# Patient Record
Sex: MALE | Race: WHITE | NOT HISPANIC OR LATINO | URBAN - METROPOLITAN AREA
[De-identification: names, ages, dates, MRNs, and addresses within clinical notes are randomized per-mention and may not be internally consistent; named-entity substitution may affect disease eponyms.]

---

## 2017-01-01 ENCOUNTER — OFFICE VISIT - HEALTHEAST (OUTPATIENT)
Dept: PHYSICAL THERAPY | Facility: REHABILITATION | Age: 82
End: 2017-01-01

## 2017-01-01 ENCOUNTER — COMMUNICATION - HEALTHEAST (OUTPATIENT)
Dept: INTERNAL MEDICINE | Facility: CLINIC | Age: 82
End: 2017-01-01

## 2017-01-01 ENCOUNTER — HOSPITAL ENCOUNTER (OUTPATIENT)
Dept: MRI IMAGING | Facility: CLINIC | Age: 82
Discharge: HOME | End: 2017-08-29
Attending: INTERNAL MEDICINE

## 2017-01-01 ENCOUNTER — OFFICE VISIT - HEALTHEAST (OUTPATIENT)
Dept: CARDIOLOGY | Facility: CLINIC | Age: 82
End: 2017-01-01

## 2017-01-01 ENCOUNTER — OFFICE VISIT - HEALTHEAST (OUTPATIENT)
Dept: INTERNAL MEDICINE | Facility: CLINIC | Age: 82
End: 2017-01-01

## 2017-01-01 ENCOUNTER — RECORDS - HEALTHEAST (OUTPATIENT)
Dept: ADMINISTRATIVE | Facility: OTHER | Age: 82
End: 2017-01-01

## 2017-01-01 ENCOUNTER — AMBULATORY - HEALTHEAST (OUTPATIENT)
Dept: NURSING | Facility: CLINIC | Age: 82
End: 2017-01-01

## 2017-01-01 ENCOUNTER — AMBULATORY - HEALTHEAST (OUTPATIENT)
Dept: INTERNAL MEDICINE | Facility: CLINIC | Age: 82
End: 2017-01-01

## 2017-01-01 DIAGNOSIS — I48.0 PAROXYSMAL ATRIAL FIBRILLATION (H): ICD-10-CM

## 2017-01-01 DIAGNOSIS — R26.9 GAIT DISTURBANCE: ICD-10-CM

## 2017-01-01 DIAGNOSIS — I25.10 CORONARY ARTERY DISEASE INVOLVING NATIVE CORONARY ARTERY OF NATIVE HEART WITHOUT ANGINA PECTORIS: ICD-10-CM

## 2017-01-01 DIAGNOSIS — M48.061 SPINAL STENOSIS, LUMBAR: ICD-10-CM

## 2017-01-01 DIAGNOSIS — I10 ESSENTIAL HYPERTENSION WITH GOAL BLOOD PRESSURE LESS THAN 130/85: ICD-10-CM

## 2017-01-01 DIAGNOSIS — M81.0 OSTEOPOROSIS: ICD-10-CM

## 2017-01-01 DIAGNOSIS — M48.00 SPINAL STENOSIS: ICD-10-CM

## 2017-01-01 DIAGNOSIS — R26.89 IMBALANCE: ICD-10-CM

## 2017-01-01 DIAGNOSIS — E78.00 PURE HYPERCHOLESTEROLEMIA: ICD-10-CM

## 2017-01-01 DIAGNOSIS — I10 ESSENTIAL HYPERTENSION: ICD-10-CM

## 2017-01-01 DIAGNOSIS — I48.20 CHRONIC ATRIAL FIBRILLATION (H): ICD-10-CM

## 2017-01-01 DIAGNOSIS — Z86.73 HISTORY OF STROKE: ICD-10-CM

## 2017-01-01 DIAGNOSIS — Z51.81 MEDICATION MONITORING ENCOUNTER: ICD-10-CM

## 2017-01-01 DIAGNOSIS — M25.561 RIGHT KNEE PAIN, UNSPECIFIED CHRONICITY: ICD-10-CM

## 2017-01-01 DIAGNOSIS — R26.81 UNSTEADY GAIT: ICD-10-CM

## 2017-01-01 DIAGNOSIS — F34.1 DYSTHYMIA: ICD-10-CM

## 2017-01-01 DIAGNOSIS — M48.061 SPINAL STENOSIS OF LUMBAR REGION: ICD-10-CM

## 2017-01-01 DIAGNOSIS — I25.10 CORONARY ARTERY DISEASE INVOLVING NATIVE CORONARY ARTERY WITHOUT ANGINA PECTORIS: ICD-10-CM

## 2017-01-01 DIAGNOSIS — M48.061 SPINAL STENOSIS OF LUMBAR REGION, UNSPECIFIED WHETHER NEUROGENIC CLAUDICATION PRESENT: ICD-10-CM

## 2017-01-01 ASSESSMENT — MIFFLIN-ST. JEOR
SCORE: 1483.6
SCORE: 1497.21
SCORE: 1510.82

## 2017-01-25 ENCOUNTER — COMMUNICATION - HEALTHEAST (OUTPATIENT)
Dept: ADMINISTRATIVE | Facility: CLINIC | Age: 82
End: 2017-01-25

## 2017-02-12 ENCOUNTER — COMMUNICATION - HEALTHEAST (OUTPATIENT)
Dept: INTERNAL MEDICINE | Facility: CLINIC | Age: 82
End: 2017-02-12

## 2017-02-12 DIAGNOSIS — E78.00 PURE HYPERCHOLESTEROLEMIA: ICD-10-CM

## 2017-03-01 ENCOUNTER — AMBULATORY - HEALTHEAST (OUTPATIENT)
Dept: CARE COORDINATION | Facility: CLINIC | Age: 82
End: 2017-03-01

## 2017-03-31 ENCOUNTER — OFFICE VISIT - HEALTHEAST (OUTPATIENT)
Dept: CARDIOLOGY | Facility: CLINIC | Age: 82
End: 2017-03-31

## 2017-03-31 DIAGNOSIS — R00.2 PALPITATIONS: ICD-10-CM

## 2017-03-31 DIAGNOSIS — I25.10 CORONARY ARTERY DISEASE INVOLVING NATIVE CORONARY ARTERY OF NATIVE HEART WITHOUT ANGINA PECTORIS: ICD-10-CM

## 2017-03-31 DIAGNOSIS — E78.00 PURE HYPERCHOLESTEROLEMIA: ICD-10-CM

## 2017-03-31 DIAGNOSIS — I48.0 PAROXYSMAL ATRIAL FIBRILLATION (H): ICD-10-CM

## 2017-03-31 ASSESSMENT — MIFFLIN-ST. JEOR: SCORE: 1533.5

## 2017-04-03 ENCOUNTER — HOSPITAL ENCOUNTER (OUTPATIENT)
Dept: CARDIOLOGY | Facility: CLINIC | Age: 82
Discharge: HOME | End: 2017-04-03
Attending: INTERNAL MEDICINE

## 2017-04-03 DIAGNOSIS — R00.2 PALPITATIONS: ICD-10-CM

## 2017-04-05 ENCOUNTER — COMMUNICATION - HEALTHEAST (OUTPATIENT)
Dept: CARDIOLOGY | Facility: CLINIC | Age: 82
End: 2017-04-05

## 2017-04-05 DIAGNOSIS — I48.20 CHRONIC ATRIAL FIBRILLATION (H): ICD-10-CM

## 2017-04-10 ENCOUNTER — COMMUNICATION - HEALTHEAST (OUTPATIENT)
Dept: INTERNAL MEDICINE | Facility: CLINIC | Age: 82
End: 2017-04-10

## 2017-04-10 DIAGNOSIS — N40.0 BPH (BENIGN PROSTATIC HYPERPLASIA): ICD-10-CM

## 2017-04-11 ENCOUNTER — OFFICE VISIT - HEALTHEAST (OUTPATIENT)
Dept: AUDIOLOGY | Facility: CLINIC | Age: 82
End: 2017-04-11

## 2017-04-11 DIAGNOSIS — H90.3 SENSORINEURAL HEARING LOSS, BILATERAL: ICD-10-CM

## 2017-04-11 DIAGNOSIS — H61.23 EXCESSIVE CERUMEN IN BOTH EAR CANALS: ICD-10-CM

## 2017-04-17 ENCOUNTER — OFFICE VISIT - HEALTHEAST (OUTPATIENT)
Dept: INTERNAL MEDICINE | Facility: CLINIC | Age: 82
End: 2017-04-17

## 2017-04-17 ENCOUNTER — COMMUNICATION - HEALTHEAST (OUTPATIENT)
Dept: INTERNAL MEDICINE | Facility: CLINIC | Age: 82
End: 2017-04-17

## 2017-04-17 DIAGNOSIS — I10 ESSENTIAL HYPERTENSION WITH GOAL BLOOD PRESSURE LESS THAN 130/85: ICD-10-CM

## 2017-04-17 DIAGNOSIS — Z51.81 MEDICATION MONITORING ENCOUNTER: ICD-10-CM

## 2017-04-17 DIAGNOSIS — M48.061 SPINAL STENOSIS, LUMBAR: ICD-10-CM

## 2017-04-17 DIAGNOSIS — E78.00 PURE HYPERCHOLESTEROLEMIA: ICD-10-CM

## 2017-04-17 DIAGNOSIS — I25.10 CORONARY ARTERY DISEASE INVOLVING NATIVE CORONARY ARTERY OF NATIVE HEART WITHOUT ANGINA PECTORIS: ICD-10-CM

## 2017-04-17 DIAGNOSIS — I48.0 PAROXYSMAL ATRIAL FIBRILLATION (H): ICD-10-CM

## 2017-04-17 ASSESSMENT — MIFFLIN-ST. JEOR: SCORE: 1547.11

## 2018-01-01 ENCOUNTER — RECORDS - HEALTHEAST (OUTPATIENT)
Dept: ADMINISTRATIVE | Facility: OTHER | Age: 83
End: 2018-01-01

## 2018-01-01 ENCOUNTER — HOME CARE/HOSPICE - HEALTHEAST (OUTPATIENT)
Dept: HOSPICE | Facility: HOSPICE | Age: 83
End: 2018-01-01

## 2018-01-01 ENCOUNTER — COMMUNICATION - HEALTHEAST (OUTPATIENT)
Dept: INTERNAL MEDICINE | Facility: CLINIC | Age: 83
End: 2018-01-01

## 2018-01-01 ENCOUNTER — AMBULATORY - HEALTHEAST (OUTPATIENT)
Dept: HOSPICE | Facility: HOSPICE | Age: 83
End: 2018-01-01

## 2018-01-01 ENCOUNTER — OFFICE VISIT - HEALTHEAST (OUTPATIENT)
Dept: INTERNAL MEDICINE | Facility: CLINIC | Age: 83
End: 2018-01-01

## 2018-01-01 ENCOUNTER — COMMUNICATION - HEALTHEAST (OUTPATIENT)
Dept: ADMINISTRATIVE | Facility: CLINIC | Age: 83
End: 2018-01-01

## 2018-01-01 DIAGNOSIS — Z51.81 MEDICATION MONITORING ENCOUNTER: ICD-10-CM

## 2018-01-01 DIAGNOSIS — I25.10 CORONARY ARTERY DISEASE INVOLVING NATIVE CORONARY ARTERY OF NATIVE HEART WITHOUT ANGINA PECTORIS: ICD-10-CM

## 2018-01-01 DIAGNOSIS — M48.061 SPINAL STENOSIS OF LUMBAR REGION, UNSPECIFIED WHETHER NEUROGENIC CLAUDICATION PRESENT: ICD-10-CM

## 2018-01-01 DIAGNOSIS — I48.0 PAROXYSMAL ATRIAL FIBRILLATION (H): ICD-10-CM

## 2018-01-01 DIAGNOSIS — E78.00 PURE HYPERCHOLESTEROLEMIA: ICD-10-CM

## 2018-01-01 DIAGNOSIS — M48.00 SPINAL STENOSIS, UNSPECIFIED SPINAL REGION: ICD-10-CM

## 2018-01-01 DIAGNOSIS — F34.1 DYSTHYMIA: ICD-10-CM

## 2018-01-01 DIAGNOSIS — I10 ESSENTIAL HYPERTENSION: ICD-10-CM

## 2018-01-01 LAB
ALBUMIN SERPL-MCNC: 3.3 G/DL (ref 3.5–5)
ALP SERPL-CCNC: 65 U/L (ref 45–120)
ALT SERPL W P-5'-P-CCNC: 11 U/L (ref 0–45)
ANION GAP SERPL CALCULATED.3IONS-SCNC: 7 MMOL/L (ref 5–18)
AST SERPL W P-5'-P-CCNC: 15 U/L (ref 0–40)
BILIRUB SERPL-MCNC: 0.7 MG/DL (ref 0–1)
BUN SERPL-MCNC: 19 MG/DL (ref 8–28)
CALCIUM SERPL-MCNC: 8.8 MG/DL (ref 8.5–10.5)
CHLORIDE BLD-SCNC: 106 MMOL/L (ref 98–107)
CHOLEST SERPL-MCNC: 128 MG/DL
CO2 SERPL-SCNC: 26 MMOL/L (ref 22–31)
CREAT SERPL-MCNC: 0.89 MG/DL (ref 0.7–1.3)
ERYTHROCYTE [DISTWIDTH] IN BLOOD BY AUTOMATED COUNT: 11.3 % (ref 11–14.5)
FASTING STATUS PATIENT QL REPORTED: YES
GFR SERPL CREATININE-BSD FRML MDRD: >60 ML/MIN/1.73M2
GLUCOSE BLD-MCNC: 96 MG/DL (ref 70–125)
HCT VFR BLD AUTO: 40.7 % (ref 40–54)
HDLC SERPL-MCNC: 63 MG/DL
HGB BLD-MCNC: 13.9 G/DL (ref 14–18)
LDLC SERPL CALC-MCNC: 54 MG/DL
MCH RBC QN AUTO: 32.4 PG (ref 27–34)
MCHC RBC AUTO-ENTMCNC: 34.2 G/DL (ref 32–36)
MCV RBC AUTO: 95 FL (ref 80–100)
PLATELET # BLD AUTO: 166 THOU/UL (ref 140–440)
PMV BLD AUTO: 6.9 FL (ref 7–10)
POTASSIUM BLD-SCNC: 3.9 MMOL/L (ref 3.5–5)
PROT SERPL-MCNC: 6 G/DL (ref 6–8)
RBC # BLD AUTO: 4.3 MILL/UL (ref 4.4–6.2)
SODIUM SERPL-SCNC: 139 MMOL/L (ref 136–145)
TRIGL SERPL-MCNC: 53 MG/DL
WBC: 3.9 THOU/UL (ref 4–11)

## 2018-01-01 RX ORDER — BISACODYL 10 MG
10 SUPPOSITORY, RECTAL RECTAL DAILY PRN
Status: SHIPPED | COMMUNITY
Start: 2018-01-01

## 2018-01-01 RX ORDER — ATROPINE SULFATE 10 MG/ML
2 SOLUTION/ DROPS OPHTHALMIC EVERY 4 HOURS PRN
Status: SHIPPED | COMMUNITY
Start: 2018-01-01

## 2018-01-01 RX ORDER — LORAZEPAM 2 MG/ML
2 CONCENTRATE ORAL
Status: SHIPPED | COMMUNITY
Start: 2018-01-01

## 2018-01-01 RX ORDER — LORAZEPAM 2 MG/ML
0.5 CONCENTRATE ORAL EVERY 4 HOURS PRN
Status: SHIPPED | COMMUNITY
Start: 2018-01-01

## 2018-01-01 ASSESSMENT — MIFFLIN-ST. JEOR: SCORE: 1540.3

## 2018-07-06 ENCOUNTER — RECORDS - HEALTHEAST (OUTPATIENT)
Dept: ADMINISTRATIVE | Facility: OTHER | Age: 83
End: 2018-07-06

## 2021-05-29 ENCOUNTER — RECORDS - HEALTHEAST (OUTPATIENT)
Dept: ADMINISTRATIVE | Facility: CLINIC | Age: 86
End: 2021-05-29

## 2021-05-30 VITALS — WEIGHT: 194 LBS | BODY MASS INDEX: 27.16 KG/M2 | HEIGHT: 71 IN

## 2021-05-30 VITALS — WEIGHT: 191 LBS | HEIGHT: 71 IN | BODY MASS INDEX: 26.74 KG/M2

## 2021-05-31 VITALS — WEIGHT: 180 LBS | BODY MASS INDEX: 25.2 KG/M2 | HEIGHT: 71 IN

## 2021-05-31 VITALS — BODY MASS INDEX: 25.62 KG/M2 | HEIGHT: 71 IN | WEIGHT: 183 LBS

## 2021-05-31 VITALS — HEIGHT: 71 IN | BODY MASS INDEX: 26.04 KG/M2 | WEIGHT: 186 LBS

## 2021-05-31 VITALS — HEIGHT: 71 IN | BODY MASS INDEX: 27.06 KG/M2

## 2021-05-31 VITALS — WEIGHT: 185 LBS | BODY MASS INDEX: 25.8 KG/M2

## 2021-06-01 VITALS — HEIGHT: 71 IN | BODY MASS INDEX: 25.1 KG/M2

## 2021-06-01 VITALS — WEIGHT: 189 LBS | HEIGHT: 72 IN | BODY MASS INDEX: 25.6 KG/M2

## 2021-06-02 ENCOUNTER — RECORDS - HEALTHEAST (OUTPATIENT)
Dept: ADMINISTRATIVE | Facility: CLINIC | Age: 86
End: 2021-06-02

## 2021-06-09 ENCOUNTER — RECORDS - HEALTHEAST (OUTPATIENT)
Dept: ADMINISTRATIVE | Facility: CLINIC | Age: 86
End: 2021-06-09

## 2021-06-09 NOTE — PROGRESS NOTES
[facility] Progress Note    Assessment:  1.  Paroxysmal atrial fibrillation with prior ablation procedure.  He had early recurrence during the first year but no documented recurrence of events.  He does have a significantly elevated chads 2 score.  He has had some occasional falling events these are typically recognized and he is able to get him self seated without a hard fall.  He was not able to have a successful placement of watchman device.  At this time his preference is to remain on Eliquis which we reviewed in detail today.  He is going to continue off aspirin.  I am suggesting an event monitor for 2 weeks to exclude occult dysrhythmia although the events are relatively infrequent and will ask that he return to visit with Dr. Odonnell.    2.  Coronary artery disease.  History of coronary intervention in 2008 without complaints of ongoing angina.  He called with some vague anginal symptoms in August 2016 and subsequently had a negative stress test.  He is on statins with the most recent LDL 45 November 2015.    3.  Hypertension.  He is off hydrochlorothiazide and blood pressures have been under good control.  He is asked to continue to monitor his blood pressure and I suggested considering changing the lisinopril to nighttime dosing.        Plan: As outlined with follow-up in 6 months    1. Palpitations  SHAILA Hook-Up   2. Paroxysmal atrial fibrillation     3. Coronary artery disease involving native coronary artery of native heart without angina pectoris     4. Hypercholesterolemia           An After Visit Summary was printed and given to the patient.    Subjective:    Junior Hudson is a 88 y.o. male who returned for a planned  follow up visit.  He reports today that he has had no complaints of chest discomfort or significant shortness of breath.  He has had infrequent falling events and now is utilizing a walker more regularly.  He is accompanied by his daughter today who indicates that  the last fall was in February 2017.  He states that during these events he does feel lightheaded or a warning symptom and then has to sit down as he feels weak all over but has not had syncope or near syncope.  This only happens in the standing position but can happen after he has been standing for some time.  He has had no additional anginal symptoms he has not utilized nitroglycerin.  He is off aspirin since he is on Eliquis.  He had an attempted placement of watchman device but this was not successful.  We reviewed the risks of bleeding versus the risks of stroke he has had prior atrial fibrillation ablation.    Review of Systems:   General: WNL  Eyes: WNL  Ears/Nose/Throat: WNL  Lungs: WNL  Heart: WNL  Stomach: WNL  Bladder: WNL  Muscle/Joints: WNL  Skin: WNL  Nervous System: WNL  Mental Health: WNL     Blood: WNL      Problem List:    Patient Active Problem List   Diagnosis     Hypercholesterolemia     Essential hypertension     Insomnia     Glaucoma     Cataract     Coronary artery disease involving native coronary artery without angina pectoris     Paroxysmal Atrial Fibrillation     Benign Prostatic Hypertrophy     Osteoporosis     Cerumen Impaction     Presbycusis     Cellulitis     Dysphagia     Hx-TIA (transient ischemic attack)     History of skin cancer     Medication monitoring encounter     Spinal stenosis, lumbar     Paroxysmal atrial fibrillation       Social History     Social History     Marital status:      Spouse name: N/A     Number of children: N/A     Years of education: N/A     Occupational History     Not on file.     Social History Main Topics     Smoking status: Never Smoker     Smokeless tobacco: Not on file     Alcohol use Not on file     Drug use: Not on file     Sexual activity: Not on file     Other Topics Concern     Not on file     Social History Narrative       No family history on file.    Current Outpatient Prescriptions   Medication Sig Dispense Refill     calcium  carbonate (OS-LEWIS) 600 mg (1,500 mg) tablet Take 600 mg by mouth 2 (two) times a day with meals.       cholecalciferol, vitamin D3, (VITAMIN D3) 1,000 unit capsule Take 1,000 Units by mouth daily.        citalopram (CELEXA) 20 MG tablet Take one (1) tablet by mouth daily 90 tablet 3     ELIQUIS 5 mg Tab tablet Take one (1) tablet every 12 hours 180 tablet 1     finasteride (PROSCAR) 5 mg tablet Take one (1) tablet daily 90 tablet 3     FOLIC ACID/MULTIVITS-MIN/LUT (CENTRUM SILVER ORAL) Take 1 tablet by mouth daily.       glucosamine-chondroitin 500-400 mg cap Take 3 capsules by mouth 2 (two) times a day.       latanoprost (XALATAN) 0.005 % ophthalmic solution Administer 1 drop to both eyes bedtime.        lisinopril (PRINIVIL,ZESTRIL) 2.5 MG tablet Take 1 tablet (2.5 mg total) by mouth daily. 30 tablet 4     nitroglycerin (NITROSTAT) 0.4 MG SL tablet Place 1 tablet under the tongue as needed for chest pain, if no relief, may repeat in 5 minutes up to a max of 3 doses, if still no relief c 25 tablet 4     omega-3 fatty acids-vitamin E 1,000 mg cap Take 2 capsules by mouth daily.       pravastatin (PRAVACHOL) 20 MG tablet Take one (1) tablet each night at bedtime 90 tablet 1     timolol maleate (TIMOPTIC) 0.5 % ophthalmic solution Administer 1 drop to both eyes 2 (two) times a day. daily       hydrochlorothiazide (MICROZIDE) 12.5 mg capsule Take 1 capsule (12.5 mg total) by mouth daily. In the morning if BP is over 140 90 capsule 1     lisinopril (PRINIVIL,ZESTRIL) 2.5 MG tablet Take 1 tablet (2.5 mg total) by mouth daily. 90 tablet 1     zolpidem (AMBIEN) 10 mg tablet Take 10 mg by mouth bedtime as needed for sleep.       zolpidem (AMBIEN) 10 mg tablet Take 1/2-1 pill at night if needed for insomnia. This medicine has risk of confusion, memory effects and fall risk. 30 tablet 0     No current facility-administered medications for this visit.        Objective:     /68 (Patient Site: Right Arm, Patient Position:  "Sitting, Cuff Size: Adult Regular)  Pulse 68  Resp 18  Ht 5' 11\" (1.803 m)  Wt 191 lb (86.6 kg)  BMI 26.64 kg/m2  191 lb (86.6 kg)     Blood pressure standing 118/70  Physical Exam:    GENERAL APPEARANCE: alert, no apparent distress  HEENT: no scleral icterus or xanthelasma  NECK: jugular venous pressure within normal limits  CHEST: symmetric, the lungs are clear to auscultation  CARDIOVASCULAR: regular rhythm with soft systolic murmur; no carotid bruits  Abdomen: No Organomegaly, masses, bruits, or tenderness. Bowels sounds are present      EXTREMITIES: no cyanosis, clubbing or edema    Cardiac Testing:  Atrial fibrillation.     Conclusions      Summary  1. Normal left ventricular size and systolic performance. The ejection  fraction is estimated to be 60%.  2. There is trace aortic insufficiency.  3. The left atrium is mildly enlarged.  4. There is no thrombus detected within the left atrium/left atrial  appendage (AUSTIN).  5. The left atrial appendage is relatively small and shallow. See attached  images (Note: to view in EPIC, one must open the attached PDF report by  clicking on \"Results\" > \"Scan on ....\" under the signature line of the  Epic generated report text below).  6. Echo contrast examination was performed using agitated NS as contrast  agent. The right heart opacity was adequate and the left heart was  adequately visualized. There was no evidence of right to left shunt during  spontaneous respiration or following release of Valsalva    CONCLUSION:   1. Lexiscan stress nuclear study is negative for inducible myocardial   ischemia or infarction.      COMMENTS:   Comparison with the images of the exam of April 7, 2011 demonstrates resolution of the minimal inferior ischemia seen on prior study.        Francine Nunez MD  Lab Results:    Lab Results   Component Value Date     11/15/2016    K 4.2 11/15/2016     11/15/2016    CO2 23 11/15/2016    BUN 15 11/15/2016    CREATININE 0.97 " 11/15/2016    CALCIUM 8.8 11/15/2016     Lab Results   Component Value Date    CHOL 122 11/04/2015    TRIG 47 11/04/2015    HDL 68 11/04/2015     BNP (pg/mL)   Date Value   02/10/2012 45     Creatinine (mg/dL)   Date Value   11/15/2016 0.97   10/24/2016 1.02   09/06/2016 1.11   08/22/2016 1.17     LDL Calculated (mg/dL)   Date Value   11/04/2015 45   11/12/2014 50   11/13/2013 57.2     Lab Results   Component Value Date    WBC 3.7 (L) 11/17/2016    WBC 3.9 (L) 11/12/2014    HGB 13.4 (L) 11/17/2016    HCT 38.3 (L) 11/17/2016    MCV 94 11/17/2016     11/17/2016               This note has been dictated using voice recognition software. Any grammatical or context distortions are unintentional and inherent to the software.      Rodney Spear M.D., F.A.C.C.  [facility]  [phone]

## 2021-06-10 NOTE — PROGRESS NOTES
[facility] Follow Up Note    Junior Hudson   88 y.o. male    Date of Visit: 4/17/2017    Chief Complaint   Patient presents with     Follow-up     4 mo follow up, tremors in hands getting worse, couple of more falls since last visit.     Subjective  Junior is here with his wife, lives with him at independent living.  Also his daughter.    Patient has had some progressive decline with unsteady gait and global weakness.  He has had moderate to severe lumbar spinal stenosis, last imaged October 2016.    He's also had some small strokes with 3 areas of subacute infarct last October and an old left basal ganglia lacunae.  There was some amyloid angiopathy noted on October 2016 MRI.  Ultrasound of the carotids was negative for stenosis.    He had fallen in February with some leg weakness.  Uses a walker at all times now.  He still walking the halls and going to the workout room and uses an elliptical-type , and feels good with that.  He is able to walk stronger when he is walking regularly.  He's had 2 small falls where he slumped to the floor, no head trauma or syncope, his legs were weak at the time.    No significant radicular pain.  No foot drop.  He does take an Aleve most mornings because of the pain in his right lower back, but does not describe it as severe.    He does have paroxysmal atrial fibrillation but no documented atrial fibrillation since ablation in 2011.  He has had small strokes as above.  Had a questionable dysarthria spell in 2014.  He is therefore still on the Eliquis for anticoagulation.  He was evaluated for the watchman device was left atrial appendage did not fit it in a BRIAN was done.  He just finished a 2 week Holter monitor after seeing his cardiologist last month.    I discussed bleeding risk with his current anticoagulant, he accepts the risk and wishes to continue at this time, given his chads score and stroke risk.    No chest pain or chest pressure.   August 2016 cardiac Lexiscan stress test was negative for ischemia and normal ejection fraction of 60%.  Past history of coronary artery disease with drug-eluting stent in 2008.  No longer on aspirin.    Still on low-dose pravastatin.    Hypertension and labile in the past.  Avoiding low blood pressures with his falls.  In December of last year's blood pressure was 112/74.  Last month with cardiology was 122/68.  Home checks it's usually in the 130s over 70s.  He is no longer using HCTZ, and he has not taken any extra lisinopril.  He takes 2.5 mg of lisinopril in the evening.    No longer taking Ambien, sleeping adequately with melatonin 10 mg in the evening.  I discussed fall risk with melatonin, but he does not believe it makes him on more unsteady or too sleepy.  Still on citalopram.    He does have frequent urination at night with his BPH.  Still on Proscar.  No dysuria.    Also regular no new abdominal pain.    Normal TSH last October.  Hemoglobin 13.4 last November.    Dizzy spells are not associated with his glaucoma drops.    Mild essential tremor, no rest tremor or parkinsonian signs.    PMHx:    Past Medical History:   Diagnosis Date     BPH (benign prostatic hyperplasia)     proscar     CAD (coronary artery disease)     CARMELLA mid PDA '08.  Neg stress echo/nl EF and wall motion.  mild MR '12     Coronary artery disease involving native coronary artery without angina pectoris     Cath 08 with severe stenosis of D2 off LAD and PDA off RCA.   Taxus stent of PDA and PTCA of RCA branch.  No intervention at D2.        Dysthymia     citalopram     Glaucoma     on drops     HTN (hypertension)     labile     Paroxysmal atrial fibrillation     none since ablation with pulm. v. isolation '11     TIA (transient ischemic attack)     Possible:  dysarthria 2/14 (carotid u/s neg for stenosis).  6/15 confusion/ left hand numb     PSHx:    Past Surgical History:   Procedure Laterality Date     CORONARY STENT PLACEMENT      9  "years ago     failed watchman  11/17/2016     SD ABLATE HEART DYSRHYTHM FOCUS      Description: Catheter Ablation Atrial Fibrillation;  Recorded: 07/15/2014;  Comments: PVI Apr 2011 (Cryo-PVI + Roof line + CTI line)     SD REMOVAL GALLBLADDER      Description: Cholecystectomy;  Recorded: 09/17/2008;  Comments: lap in '90     Immunizations:   Immunization History   Administered Date(s) Administered     Influenza high dose, seasonal 09/06/2016     Influenza, Seasonal, Inj PF 10/26/2010     Influenza, inj, historic 10/20/2008, 09/29/2009, 09/15/2011, 09/29/2012     Pneumo Conj 13-V (2010&after) 05/04/2015     Pneumo Polysac 23-V 05/04/1993, 08/15/2002     Td, historic 04/06/2000       ROS A comprehensive review of systems was performed and was otherwise negative    Medications, allergies, and problem list were reviewed and updated    Exam  /78  Pulse 65  Ht 5' 11\" (1.803 m)  Wt 194 lb (88 kg)  SpO2 96%  BMI 27.06 kg/m2  Appears well alert and oriented ×3.  Mild essential type tremor in the hands.  Not appearing Parkinsonian.  It was stand up from a sitting position and ambulate to exam table.  Able to climb up on the exam table with standby assistance and his walker.  No JVD.  Lungs are clear to auscultation.  Face is symmetric and extract her muscles intact.  Speech within normal limits.  Moving all 4 extremities.  No foot drop.  Heart is regular without murmur.  Abdomen soft nontender.  Trace- +1 lower extremity edema, slightly increased, he did have Easter ham yesterday.    Assessment/Plan  1. Essential hypertension with goal blood pressure less than 130/85  Runs on the low side, but controlled.  Avoiding low blood pressures with his falls.  Continue lisinopril 2.5 mg a day at this time at night.  If lower blood pressures, may need to discontinue.  He will not take an extra lisinopril unless blood pressure spike above 160/90.    2. Hypercholesterolemia  Pravastatin.    3. Coronary artery disease involving " native coronary artery of native heart without angina pectoris  Asymptomatic, no aspirin as he is on the Eliquis.    4. Paroxysmal atrial fibrillation  No evidence of recurrence, with history of strokes and will stay on anticoagulation.  2 week Holter monitor result pending, to evaluate for occult paroxysmal atrial fibrillation.  He'll follow-up with the cardiologist as needed    5. Spinal stenosis, lumbar  Moderate to severe, he does not wish to consider surgery.  Maintain regular ambulation.  Continue to use four-wheel walker at all times.    He does wish to continue on the Aleve.  I did warn him on bleeding risk, as well as effect on blood pressure and risk for renal injury.  Patient feels the benefit outweighs the risk for him and he wishes to continue.    6. Medication monitoring encounter    - Comprehensive Metabolic Panel    Insomnia and mild dysthymia.  Continue citalopram and melatonin daily at bedtime.    BPH, stable on Proscar.    Continue glaucoma drops.    Heartburn controlled on omeprazole.    Return in about 6 months (around 10/17/2017) for Recheck.   There are no Patient Instructions on file for this visit.  Ras Murguia MD  Total time with patient over 25 minutes and over 50% coord care.  Time all face to face.  The following high BMI interventions were performed this visit: encouragement to exercise    Current Outpatient Prescriptions   Medication Sig Dispense Refill     calcium carbonate (OS-LEWIS) 600 mg (1,500 mg) tablet Take 600 mg by mouth 2 (two) times a day with meals.       cholecalciferol, vitamin D3, (VITAMIN D3) 1,000 unit capsule Take 1,000 Units by mouth daily.        citalopram (CELEXA) 20 MG tablet Take one (1) tablet by mouth daily 90 tablet 3     ELIQUIS 5 mg Tab tablet Take one (1) tablet every 12 hours 180 tablet 1     finasteride (PROSCAR) 5 mg tablet Take one (1) tablet daily 90 tablet 3     FOLIC ACID/MULTIVITS-MIN/LUT (CENTRUM SILVER ORAL) Take 1 tablet by mouth daily.        glucosamine-chondroitin 500-400 mg cap Take 3 capsules by mouth 2 (two) times a day.       latanoprost (XALATAN) 0.005 % ophthalmic solution Administer 1 drop to both eyes bedtime.        lisinopril (PRINIVIL,ZESTRIL) 2.5 MG tablet Take 1 tablet (2.5 mg total) by mouth daily. 30 tablet 4     melatonin 10 mg cap Take by mouth.       omega-3 fatty acids-vitamin E 1,000 mg cap Take 2 capsules by mouth daily.       pravastatin (PRAVACHOL) 20 MG tablet Take one (1) tablet each night at bedtime 90 tablet 1     timolol maleate (TIMOPTIC) 0.5 % ophthalmic solution Administer 1 drop to both eyes 2 (two) times a day. daily       nitroglycerin (NITROSTAT) 0.4 MG SL tablet Place 1 tablet under the tongue as needed for chest pain, if no relief, may repeat in 5 minutes up to a max of 3 doses, if still no relief c 25 tablet 4     No current facility-administered medications for this visit.      Allergies   Allergen Reactions     Sulfa (Sulfonamide Antibiotics) Rash     Social History   Substance Use Topics     Smoking status: Never Smoker     Smokeless tobacco: None     Alcohol use None

## 2021-06-10 NOTE — PROGRESS NOTES
Audiology only; hearing aid check     Patient reported intermittent functionality of left device; right device is working well. Visual inspection indicated both devices to be in good repair; right device's wax guard was partially occluded and changed, as was the dome. Listening checks revealed strong signals, free from distortion in each device. Otoscopy yielded partial cerumen occlusions in both ears; it is possible that if cerumen shifts or dome is placed next to cerumen, sound quality of devices is affected or unable to be perceived by Mr. Hudson. Cerumen management by a physician was recommended. Further HAC appointments may be made on an as-needed basis. Mr. Hudson and his adult daughter both expressed verbal understanding of this information and plan.    Bryant Mosher, [facility]  [place] Licensed Audiologist [id number]

## 2021-06-12 NOTE — PROGRESS NOTES
[facility] Daily Progress     Patient Name: Junior Hudson  Date: 9/13/2017  Visit #: 4/12  Referral Diagnosis: Spinal Stenosis, unsteady gait  Referring provider: Ras Murguia MD  Visit Diagnosis:     ICD-10-CM    1. Spinal stenosis, lumbar M48.06    2. Imbalance R26.89    3. Gait disturbance R26.9    4. Osteoporosis M81.0        Assessment:     Patient presents with gait disturbance, lumbar stenosis and history of falls due to bilateral leg weakness and imbalance. He is a fall risk scoring 20 seconds on his TUG test with 14 seconds or less being a decreased falls risk. Patient able to tolerate 25 minutes of continuous activity with SBA. Patient demonstrates decreased confidence in higher level activity and needs encouragement to challenge self.        HEP/POC compliance is  good .  Patient demonstrates understanding/independence with home program.  Patient is benefitting from skilled physical therapy and is making steady progress toward functional goals.  Patient is appropriate to continue with skilled physical therapy intervention, as indicated by initial plan of care.  Patient is progressing appropriately regarding strength, mobility and symptom management.    Goal Status:  Pt. will be independent with home exercise program in : in other weeks;12 weeks  Pt. will be able to walk : 10 minutes;with less difficulty;for household mobility;for community mobility;for exercise/recreation;other minutes;in other weeks  Other Minutes:: 12-15 minutes  Other Weeks:: 8-10 weeks  Patient will ascend / descend: step;curb;with assistive device;with less difficulty;in 6 weeks  Patient will transfer: sit/stand;supine/sit;floor/stand;for in/out of bed;for in/out of chair;with less difficulty;in other weeks  other weeks: 8-10 weeks  No Data Recorded    Plan / Patient Education:     Continue with initial plan of care.  Progress with home program as tolerated:     Plan for next visit: functional mobility, curb and  "step training, sit to stands, yoga ball ex, - patient would like to be able to do the step at his daughters house, which he had a lot of difficulty with before.     Subjective:     Pain Ratin Only able to perform new exercises 1x a day, Going to reception this weekend and needs to negotiate stairs    Functional limitations are described as occurring with:   performing routine daily activities  transitional movements sit to stand and sit to supine  walking for extended periods of time      Objective:   Transfers sit to stand independent, arms on chair, safely   Patient ambulates with RW but was able to ambulate without, using CGA for safety  Nu Step x 7 min, level 4, seat 13 arms 11  Stairs retro and lateral one step with weight shifting, Up/down 4 steps, 2 rails SBA up left/down right  Treatment Today     Review of HEP  Exercises:  Exercise #1: sit/ 30\" daily  Comment #1: goal 5+  Exercise #2: seated marching  Comment #2: 20 times 2x daily  Exercise #3: hip extension/abduction standing at counter adding \"teetor totter\" for balance  Comment #3: 10 times 2x daily      TREATMENT MINUTES COMMENTS   Evaluation     Self-care/ Home management     Manual therapy     Neuromuscular Re-education     Therapeutic Activity     Therapeutic Exercises 15 See HEP   Gait training 12 See above stairs   Modality__________________                Total 27    Blank areas are intentional and mean the treatment did not include these items.       Lizzie Bell, PT  2017    "

## 2021-06-12 NOTE — PROGRESS NOTES
[facility] Daily Progress     Patient Name: Junior Hudson  Date: 9/8/2017  Visit #: 3/12  Referral Diagnosis: Spinal Stenosis, unsteady gait  Referring provider: Ras Murguia MD  Visit Diagnosis:     ICD-10-CM    1. Spinal stenosis, lumbar M48.06    2. Imbalance R26.89    3. Gait disturbance R26.9        Assessment:     Patient presents with gait disturbance, lumbar stenosis and history of falls due to bilateral leg weakness and imbalance. He is a fall risk scoring 20 seconds on his TUG test with 14 seconds or less being a decreased falls risk.    Today patient was able to perform 10 sit to stands using arm rests for support but did not reach for his rolling walker for balance once standing. Patient was educated to scoot himself forward in the chair and to use momentum of his body weight to help assist him for getting up from chair. Patient is very motivated in therapy, today was spent on stepping and gait training. Added hip abduction/extension to HEP.     HEP/POC compliance is  good .  Patient demonstrates understanding/independence with home program.  Patient is benefitting from skilled physical therapy and is making steady progress toward functional goals.  Patient is appropriate to continue with skilled physical therapy intervention, as indicated by initial plan of care.  Patient is progressing appropriately regarding strength, mobility and symptom management.    Goal Status:  Pt. will be independent with home exercise program in : in other weeks;12 weeks  Pt. will be able to walk : 10 minutes;with less difficulty;for household mobility;for community mobility;for exercise/recreation;other minutes;in other weeks  Other Minutes:: 12-15 minutes  Other Weeks:: 8-10 weeks  Patient will ascend / descend: step;curb;with assistive device;with less difficulty;in 6 weeks  Patient will transfer: sit/stand;supine/sit;floor/stand;for in/out of bed;for in/out of chair;with less difficulty;in other  "weeks  other weeks: 8-10 weeks  No Data Recorded    Plan / Patient Education:     Continue with initial plan of care.  Progress with home program as tolerated:     Plan for next visit: functional mobility, curb and step training, sit to stands, yoga ball ex, - patient would like to be able to do the step at his daughters house, which he had a lot of difficulty with before.     Subjective:     Pain Ratin  Patient reports he has been doing really well with the exercise Lizzie has given him, he has been working on it 30 seconds per day. He was able to perform a sit to  the lobby without using his walker for assistance and is proud of it. He is having some R knee pain where he is thinking about getting a shot for his pain. He has been doing the recumbent bike at his living facility.     Functional limitations are described as occurring with:   performing routine daily activities  transitional movements sit to stand and sit to supine  walking for extended periods of time      Objective:     Patient ambulates with RW but was able to ambulate without, using CGA for safety    Treatment Today       Exercises:  Exercise #1: sit/ 30\" daily  Comment #1: goal 5+  Exercise #2: seated marching  Comment #2: 20 times 2x daily  Exercise #3: hip extension/abduction standing at counter  Comment #3: 10 times 2x daily      TREATMENT MINUTES COMMENTS   Evaluation     Self-care/ Home management     Manual therapy     Neuromuscular Re-education     Therapeutic Activity     Therapeutic Exercises 10 See above flowsheet; Nustep 6 minutes; seated marching, added hip abd and extension to HEP    Gait training 15 Curb ambulation 2x using walking 2x w/out walker but HHA and CGA - patient very hesitant and nervous but was able to perform with no loss of balance  Sit to stands 10 reps using hands and momentum - no LOB   Modality__________________                Total 25    Blank areas are intentional and mean the treatment did " not include these items.       Sharee Goldman, PT  9/8/2017

## 2021-06-12 NOTE — PROGRESS NOTES
[facility] Daily Progress     Patient Name: Junior Hudson  Date: 9/5/2017  Visit #: 2/12  Referral Diagnosis: Spinal Stenosis, unsteady gait  Referring provider: Ras Murguia MD  Visit Diagnosis:     ICD-10-CM    1. Spinal stenosis, lumbar M48.06    2. Imbalance R26.89    3. Gait disturbance R26.9        Assessment:     Patient presents with gait disturbance, lumbar stenosis and history of falls due to bilateral leg weakness and imbalance. He is a fall risk scoring 20 seconds on his TUG test with 14 seconds or less being a decreased falls risk.    Today patient was able to perform 3 sit to stands using arm rests for support but did not reach for his rolling walker for balance once standing. Patient is very motivated in therapy, today was spent on stepping and gait training. Added seated marching to HEP.     HEP/POC compliance is  good .  Patient demonstrates understanding/independence with home program.  Patient is benefitting from skilled physical therapy and is making steady progress toward functional goals.  Patient is appropriate to continue with skilled physical therapy intervention, as indicated by initial plan of care.  Patient is progressing appropriately regarding strength, mobility and symptom management.    Goal Status:  Pt. will be independent with home exercise program in : in other weeks;12 weeks  Pt. will be able to walk : 10 minutes;with less difficulty;for household mobility;for community mobility;for exercise/recreation;other minutes;in other weeks  Other Minutes:: 12-15 minutes  Other Weeks:: 8-10 weeks  Patient will ascend / descend: step;curb;with assistive device;with less difficulty;in 6 weeks  Patient will transfer: sit/stand;supine/sit;floor/stand;for in/out of bed;for in/out of chair;with less difficulty;in other weeks  other weeks: 8-10 weeks  No Data Recorded    Plan / Patient Education:     Continue with initial plan of care.  Progress with home program as tolerated:  "    Plan for next visit: functional mobility, curb and step training, sit to stands, yoga  Ball     Subjective:     Pain Ratin  Patient reports he has been doing really well with the exercise Lizzie gave him, he has been working on it 30 seconds per day. He was able to perform a sit to  the lobby without using his walker for assistance. He has been doing the recumbent bike at his living facility.     Functional limitations are described as occurring with:   performing routine daily activities  transitional movements sit to stand and sit to supine  walking for extended periods of time      Objective:     Patient ambulates with RW but was able to ambulate without, using CGA for safety    Treatment Today       Exercises:  Exercise #1: sit/ 30\" daily  Comment #1: goal 5+  Exercise #2: seated marching  Comment #2: 20 times 2x daily      TREATMENT MINUTES COMMENTS   Evaluation     Self-care/ Home management     Manual therapy     Neuromuscular Re-education     Therapeutic Activity     Therapeutic Exercises 10 See above flowsheet; yoga ball marching and balance - 6 minutes; added seated marching to HEP    Gait training 15 Step ups to 4\" step, toe tops to step  40ft x 6 CGA normal gait pattern w/ and without walker     Modality__________________                Total 25    Blank areas are intentional and mean the treatment did not include these items.       Sharee Goldman, PT  2017      "

## 2021-06-12 NOTE — PROGRESS NOTES
[facility] Follow Up Note    Junior Hudson   89 y.o. male    Date of Visit: 8/24/2017    Chief Complaint   Patient presents with     Fall     2 falls in 1 week. pt fell on 8/22 and fell on Rt side.      Tom Verdugo is here with his wife and daughter after a fall on August 22.    Patient lives at independent living with wife.  Patient has had a number of falls, February of this year.    One week ago patient fell trying to get out of the tub, no major trauma did not seek medical attention.    2 days ago patient was walking upstairs and lost balance, felt his legs suddenly get weak and fell on his right elbow and a slight knock of his right head but no loss of consciousness and no head pain now.  He went to the emergency room but the wait was too long and was not evaluated.  No significant pain now.  He is back to his usual ambulation with walker.  But he has a stiff wobbly gait.  No pain, but does complain of stiffness and if he walks too far his legs will get very weak and he needs to sit down.    Patient has had a steady worsening unsteady gait and neuro type claudication with walking over the past year.    He does go down to the workout room and does the recumbent bike and elliptical  at times, but not as regularly as it should.    He has moderate to severe lumbar stenosis on October 2016 MRI of the LS-spine.  He does use a 4 wheeled walker at all times.    He has moderate BPH and gets up often at night to go to the bathroom.  On Proscar.  No dysuria or increased urinary frequency beyond baseline.    He has had 3 subacute CVAs back in October 2016 and an old's small ganglion with Coumadin.  He had a dysarthria episode in 2014.  He has some amyloid angiopathy October 2016 MRI.  Carotid arteries were negative for stenosis.    He has been on Eliquis for many years.  He has not had paroxysmal atrial fibrillation since ablation back in 2011.  Holter monitor for 14 days April  2017 was negative for atrial fibrillation.  BRIAN showed his left atrial appendage anatomy was not adequate for a watchman.    No new neurologic changes or TIA type symptoms.    No chest pain or palpitations.  April 2016 Lexiscan stress test was negative.  Ejection fraction 60%.  Ronald history of heart disease with drug-eluting stent back in 2008.  Still on pravastatin.  No generalized myalgias.  Cholesterol well-controlled in 2015 and normal liver tests in April of this year.    No GI bleeding or change in bowels.  No diarrhea.  He has been eating normally and weight is stable.    He does not take Ambien at night.  He has used melatonin.  Continues on citalopram for chronic dysthymia.    Glaucoma stable on drops.    His hypertension is been well-controlled blood pressures 120/70 to 140/80.  No low blood pressures.  On lisinopril 2.5 mg a day.  Lower extremity edema is a trace level, chronic.    No change in his mild essential tremor.  No drooling or parkinsonian signs.    PMHx:    Past Medical History:   Diagnosis Date     BPH (benign prostatic hyperplasia)     proscar     CAD (coronary artery disease)     CARMELLA mid PDA '08.  Neg stress echo/nl EF and wall motion.  mild MR '12     Coronary artery disease involving native coronary artery without angina pectoris     Cath 08 with severe stenosis of D2 off LAD and PDA off RCA.   Taxus stent of PDA and PTCA of RCA branch.  No intervention at D2.        Dysthymia     citalopram     Glaucoma     on drops     HTN (hypertension)     labile     Paroxysmal atrial fibrillation     none since ablation with pulm. v. isolation '11     TIA (transient ischemic attack)     Possible:  dysarthria 2/14 (carotid u/s neg for stenosis).  6/15 confusion/ left hand numb     PSHx:    Past Surgical History:   Procedure Laterality Date     CORONARY STENT PLACEMENT      9 years ago     failed watchman  11/17/2016     OK ABLATE HEART DYSRHYTHM FOCUS      Description: Catheter Ablation Atrial  "Fibrillation;  Recorded: 07/15/2014;  Comments: PVI Apr 2011 (Cryo-PVI + Roof line + CTI line)     WA REMOVAL GALLBLADDER      Description: Cholecystectomy;  Recorded: 09/17/2008;  Comments: lap in '90     Immunizations:   Immunization History   Administered Date(s) Administered     Influenza high dose, seasonal 09/06/2016     Influenza, Seasonal, Inj PF 10/26/2010     Influenza, inj, historic 10/20/2008, 09/29/2009, 09/15/2011, 09/29/2012     Pneumo Conj 13-V (2010&after) 05/04/2015     Pneumo Polysac 23-V 05/04/1993, 08/15/2002     Td, historic 04/06/2000       ROS A comprehensive review of systems was performed and was otherwise negative    Medications, allergies, and problem list were reviewed and updated    Exam  /80  Pulse 68  Ht 5' 11\" (1.803 m)  Wt 186 lb (84.4 kg)  SpO2 97%  BMI 25.94 kg/m2  Alert and oriented ×3.  Pupils and irises equal and reactive.  Animated affect.  No rest tremor.  Lungs are clear.  Heart is regular without murmur.  Abdomen is nontender.  There is no evidence of scalp trauma.  He has some difficulty getting up from the chair with use of arms to get him up.  Is a fairly short based gait and stiff mildly kyphotic gait.  No foot drop.    Assessment/Plan  1. Spinal stenosis, lumbar  Increasing unsteady gait with increasing falls.  I suspect worsening spinal stenosis.  Disability is getting to the point where he may need to consider decompressive surgery.  If MRI shows severe spinal stenosis, refer to neurosurgery spine clinic.    If the MRI is stable, consider referral for more physical therapy.  I stressed the importance of using his 4 wheeled walker at all times.  His legs do feel tired he should sit down immediately and rest until better.    Pain is not an issue for him  - MR Brain With Without Contrast; Future  - MR Lumbar Spine With Without Contrast; Future    Continue regular ambulation with walker and bike and elliptical  in the workout room.  I stressed the " importance of regular ambulation.    2. Unsteady gait  Consistent with increased stiffness with spinal stenosis.    With the fall and minor head trauma, will have him undergo an MRI of the brain.    No evidence of new stroke at this time.  - MR Brain With Without Contrast; Future  - MR Lumbar Spine With Without Contrast; Future    3. History of stroke  No recent history of atrial fibrillation.  Increasing falls with significant bleeding risk.  I discussed discontinuing Eliquis.  I discussed possible increased stroke risk with stopping Eliquis.  I discussed intracranial bleeding risk with head trauma being on Eliquis.  His daughter, wife and patient agree to stop Eliquis and they accept possible increased stroke risk.    4. Paroxysmal atrial fibrillation  No evidence of recurrence    5. Coronary artery disease involving native coronary artery of native heart without angina pectoris  Asymptomatic.  Pravastatin.  Consider low-dose aspirin once he is off Eliquis, discuss a two-week follow-up.    6. Essential hypertension with goal blood pressure less than 130/85  Blood pressure well controlled and no evidence of hypotension.  Continue lisinopril 2.5 mg a day.    BPH stable on Proscar.  He denies UTI type symptoms.    Glaucoma, on drops    Return in about 2 weeks (around 9/7/2017) for Recheck.   There are no Patient Instructions on file for this visit.  Ras Murguia MD  Total time with patient over 25 minutes and over 50% coord care.  Time all face to face.      Current Outpatient Prescriptions   Medication Sig Dispense Refill     calcium carbonate (OS-LEWIS) 600 mg (1,500 mg) tablet Take 600 mg by mouth 2 (two) times a day with meals.       cholecalciferol, vitamin D3, (VITAMIN D3) 1,000 unit capsule Take 1,000 Units by mouth daily.        citalopram (CELEXA) 20 MG tablet Take one (1) tablet by mouth daily 90 tablet 3     finasteride (PROSCAR) 5 mg tablet Take one (1) tablet daily 90 tablet 3     FOLIC  ACID/MULTIVITS-MIN/LUT (CENTRUM SILVER ORAL) Take 1 tablet by mouth daily.       glucosamine-chondroitin 500-400 mg cap Take 3 capsules by mouth 2 (two) times a day.       latanoprost (XALATAN) 0.005 % ophthalmic solution Administer 1 drop to both eyes bedtime.        lisinopril (PRINIVIL,ZESTRIL) 2.5 MG tablet Take 1 tablet (2.5 mg total) by mouth daily. 90 tablet 0     melatonin 10 mg cap Take by mouth.       nitroglycerin (NITROSTAT) 0.4 MG SL tablet Place 1 tablet under the tongue as needed for chest pain, if no relief, may repeat in 5 minutes up to a max of 3 doses, if still no relief c 25 tablet 4     omega-3 fatty acids-vitamin E 1,000 mg cap Take 2 capsules by mouth daily.       pravastatin (PRAVACHOL) 20 MG tablet Take one (1) tablet each night at bedtime 90 tablet 1     timolol maleate (TIMOPTIC) 0.5 % ophthalmic solution Administer 1 drop to both eyes 2 (two) times a day. daily       No current facility-administered medications for this visit.      Allergies   Allergen Reactions     Sulfa (Sulfonamide Antibiotics) Rash     Social History   Substance Use Topics     Smoking status: Never Smoker     Smokeless tobacco: None     Alcohol use None

## 2021-06-12 NOTE — PROGRESS NOTES
[facility] Certification Request    September 1, 2017      Patient: Junior Hudson  MR Number: [id number]  YOB: 1928  Date of Visit: 9/1/2017      Dear Dr. Ras Murguia:    Thank you for this referral.   We are seeing Junior Hudson for Physical Therapy of lumbar stenosis with gait disturbance and falls.    Medicare and/or Medicaid requires physician review and approval of the treatment plan. Please review the plan of care and verify that you agree with the therapy plan of care by co-signing this note.      Plan of Care  Authorization / Certification Start Date: 09/01/17  Authorization / Certification End Date: 11/30/17  Communication with: Referral Source  Patient Related Instruction: Nature of Condition;Basis of treatment;Posture;Treatment plan and rationale;Expected outcome  Times per Week: 2  Number of Weeks: 6-12  Number of Visits: 12  Discharge Planning: Independent in HEP  Precautions / Restrictions : falls  Therapeutic Exercise: ROM;Stretching;Strengthening  Neuromuscular Reeducation: balance/proprioception;core  Functional Training (ADL's): instructions in transfers;ADL's  Gait Training: with 4WW    Goals:  Pt. will be independent with home exercise program in : in other weeks;12 weeks  Pt. will be able to walk : 10 minutes;with less difficulty;for household mobility;for community mobility;for exercise/recreation;other minutes;in other weeks  Other Minutes:: 12-15 minutes  Other Weeks:: 8-10 weeks  Patient will ascend / descend: step;curb;with assistive device;with less difficulty;in 6 weeks  Patient will transfer: sit/stand;supine/sit;floor/stand;for in/out of bed;for in/out of chair;with less difficulty;in other weeks  other weeks: 8-10 weeks  No Data Recorded      If you have any questions or concerns, please don't hesitate to call.    Sincerely,      Lizzie Bell, PT        Physician recommendation:     ___ Follow therapist's recommendation        ___ Modify  therapy      *Physician co-signature indicates they certify the need for these services furnished within this plan and while under their care.    [facility]   Balance Initial Evaluation    Patient Name: Junior Hudson  Date of evaluation: 9/1/2017  Referral Diagnosis: Spinal stenosis  Referring provider: Ras Murguia MD  Visit Diagnosis:     ICD-10-CM    1. Spinal stenosis, lumbar M48.06    2. Imbalance R26.89    3. Gait disturbance R26.9    4. Chronic atrial fibrillation I48.2    5. Coronary artery disease involving native coronary artery without angina pectoris I25.10    6. Osteoporosis M81.0    7. Spinal stenosis of lumbar region M48.06        Assessment:    Patient presents with gait disturbance, lumbar stenosis and history of falls due to bilateral leg weakness and imbalance. He is a fall risk scoring 20 seconds on his TUG test with 14 seconds or less being a decreased falls risk. He also has an Osteoporosis diagnosis that could make him prone to fractures if fall risk. He is appropriate for physical therapy to address functional activities with decreased falls risk and bilateral lower extremity weakness.  Pt. is a good candidate for skilled PT services to improve pain levels and function.    Goals:  Pt. will be independent with home exercise program in : in other weeks;12 weeks  Pt. will be able to walk : 10 minutes;with less difficulty;for household mobility;for community mobility;for exercise/recreation;other minutes;in other weeks  Other Minutes:: 12-15 minutes  Other Weeks:: 8-10 weeks  Patient will ascend / descend: step;curb;with assistive device;with less difficulty;in 6 weeks  Patient will transfer: sit/stand;supine/sit;floor/stand;for in/out of bed;for in/out of chair;with less difficulty;in other weeks  other weeks: 8-10 weeks  No Data Recorded    Patient's expectations/goals are realistic with limitations     Barriers to Learning or Achieving Goals:  Co-morbidities or other  medical factors.  lumbar stenosis  Age.        Plan / Patient Instructions:        Plan of Care:   Authorization / Certification Start Date: 09/01/17  Authorization / Certification End Date: 11/30/17  Communication with: Referral Source  Patient Related Instruction: Nature of Condition;Basis of treatment;Posture;Treatment plan and rationale;Expected outcome  Times per Week: 2  Number of Weeks: 6-12  Number of Visits: 12  Discharge Planning: Independent in HEP  Precautions / Restrictions : falls  Therapeutic Exercise: ROM;Stretching;Strengthening  Neuromuscular Reeducation: balance/proprioception;core  Functional Training (ADL's): instructions in transfers;ADL's  Gait Training: with 4WW    Plan for next visit: Continue strength and balance drills, safe functional activity, curb or step negotiation and floor to stand transfers     Subjective:         Social information:   Living Situation:condo, lives with others  and has assistance Yes   Occupation:retired   Work Status:NA   Equipment Available: None and 4WW always outside of apartment, and occasionally inside, Has a SPC but does not use it very often    History of Present Illness:    Junior is a 89 y.o. male who presents to therapy today with complaints of frequent falls with lumbar stenosis. He does not have any pain with the spine but more so the weakness in his legs is the biggest problem. Date of onset duration of symptoms is over the past year. Onset has been gradual. Symptoms are getting worse. He reports a history of similar symptoms with 5 falls in the past year. He describes his previous level of function as limited with ADLs and static standing activityHe is unable to get up from the floor once he has fallen or unable to get up from a seated position and requires assist of two and frequently calls EMS. He has also fallen trying to negotiate a step at his daughters house while placing his 4WW on the step and then attempting to ascend. He lives with his  "wife who is unable to assist him when he falls. His daughter Bre is helpful when she is available.    Pain Ratin  Pain rating at best: 0  Pain description: weakness    Functional limitations are described as occurring with:   performing routine daily activities  transitional movements sit to stand and sit to supine  walking for extended periods of time         Objective:      Note: Items left blank indicates the item was not performed or not indicated at the time of the evaluation.      Balance Examination  1. Spinal stenosis, lumbar     2. Imbalance     3. Gait disturbance     4. Chronic atrial fibrillation     5. Coronary artery disease involving native coronary artery without angina pectoris     6. Osteoporosis     7. Spinal stenosis of lumbar region       Involved side: no pain, lumbar  Posture Observation:      General sitting posture is  fair, mildly flexed posture.  Assistive Device:  4WW  Gait Observation:  Ambulates with flexed spine posture    LE Strength: global weakness in BLE    LE ROM: WFL, lacks hip extension    Balance Assessment:      TU seconds indicating a risk for falls  Sit/stand 4 reps with hands in 30\"  Other:  2 minute walk test 287' with 4WW    Exercises:  Exercise #1: sit/ 30\" daily  Comment #1: goal 5+    Treatment Today     TREATMENT MINUTES COMMENTS   Evaluation 30    Self-care/ Home management 15 Use of shower bench and grab bars in shower to avoid LE fatigue, curb/step negotiation   Manual therapy     Neuromuscular Re-education     Therapeutic Activity     Therapeutic Exercises 15 Sit/stand daily in 30\"   Gait training     Modality__________________                Total 60    Blank areas are intentional and mean the treatment did not include these items.              Lizzie Bell, PT  2017  8:28 AM      PT Evaluation Code: (Please list factors)  Patient History/Comorbidities: lumbar stenosis,gait disturbance, Osteoporosis, CAD  Examination: decreased strength, " imbalance, gait disturbance with falls  Clinical Presentation: 3+ elements  Clinical Decision Making: mod    Patient History/  Comorbidities Examination  (body structures and functions, activity limitations, and/or participation restrictions) Clinical Presentation Clinical Decision Making (Complexity)   No documented Comorbidities or personal factors 1-2 Elements Stable and/or uncomplicated Low   1-2 documented comorbidities or personal factor 3 Elements Evolving clinical presentation with changing characteristics Moderate   3-4 documented comorbidities or personal factors 4 or more Unstable and unpredictable High

## 2021-06-13 NOTE — PROGRESS NOTES
[facility] Daily Progress/Discharge summary    Patient Name: Junior Hudson  Date: 9/29/2017  Visit #: 9/12  Referral Diagnosis: Spinal Stenosis, unsteady gait  Referring provider: Ras Murguia MD  Visit Diagnosis:     ICD-10-CM    1. Spinal stenosis, lumbar M48.06    2. Imbalance R26.89    3. Gait disturbance R26.9    4. Osteoporosis M81.0        Assessment:     Patient presents with gait disturbance, lumbar stenosis and history of falls due to bilateral leg weakness and imbalance. He is a fall risk scoring 17 seconds on his TUG test with 14 seconds or less being a decreased falls risk. He did improve this score since evaluation as well as his sit/stand ability. Patient able to tolerate 25 minutes of continuous activity with SBA and independent transfers. Patient demonstrates decreased confidence in higher level activity and needs encouragement to challenge self. Recommend use of AD with all functional activity. He has an established HEP, daughter Bre present today and observed his progress thus far. Will continue at home.      HEP/POC compliance is  good .  Patient demonstrates understanding/independence with home program.  Patient is benefitting from skilled physical therapy and is making steady progress toward functional goals.  Patient is appropriate to continue with skilled physical therapy intervention, as indicated by initial plan of care.  Patient is progressing appropriately regarding strength, mobility and symptom management.    Goal Status:MET with exception of transfer floor to stand goal, patient did not want to complete  goal  Pt. will be independent with home exercise program in : in other weeks;12 weeks  Pt. will be able to walk : 10 minutes;with less difficulty;for household mobility;for community mobility;for exercise/recreation;other minutes;in other weeks  Other Minutes:: 12-15 minutes  Other Weeks:: 8-10 weeks  Patient will ascend / descend: step;curb;with assistive  "device;with less difficulty;in 6 weeks  Patient will transfer: sit/stand;supine/sit;floor/stand;for in/out of bed;for in/out of chair;with less difficulty;in other weeks  other weeks: 8-10 weeks  No Data Recorded    Plan / Patient Education:     Continue with initial plan of care.  Progress with home program as tolerated:     Plan for next visit: [facility], Patient to call within 30 days if needs to return before authorization period ends    Subjective:     Pain Ratin No complaints    Functional limitations are described as occurring with:   performing routine daily activities -improving  transitional movements sit to stand and sit to supine-sit stand improved  walking for extended periods of time-improving  Has not had any falls      Objective:   Transfers sit to stand independent, arms on chair, safely and without LOB upon initial stance, transfers sit<>supine independent  Patient ambulates with RW but was able to ambulate without, SBA for safety  TUG in 17 seconds  Today:  Nu Step x 8 min, level 5, seat 13 arms 11-not today  Transfers sit<>supine independent  LTR use of abdominals, abdominal marches-not today  Standing balance head turns 30\" looking at X five feet away, each stance, CGA/SBA, teetor totter  Standing BLE exercises #3, 1/2 plank at bars reaching across holding and breathing using abdominals and UE strength to return to stance position, increased difficulty on high mat-not today  Sit/stand from elevated surface without arm rests, arms crossed, cues to look up with extension of spine, x 12 reps  Up/down 4 steps independent wit two rail unble to perform at initial evalution    Review of HEP  Exercises:  Exercise #1: sit/ 30\" daily  Comment #1: goal 5+  Exercise #2: LTR with abdominals  Comment #2: supine or seated abdominal marching  Exercise #3: hip extension/abduction standing at counter adding \"teetor totter\" for balance  Comment #3: 10 times 2x daily  Exercise #4: standing rhomberg " balance activities with hed trns  Comment #4: daily  Exercise #5: bridge with transfer to 1/2 stance in S/L for weightbearing      TREATMENT MINUTES COMMENTS   Evaluation     Self-care/ Home management     Manual therapy     Neuromuscular Re-education 13    Therapeutic Activity     Therapeutic Exercises 15 See HEP   Gait training     Modality__________________                Total 28    Blank areas are intentional and mean the treatment did not include these items.       Lizzie Bell, PT  9/29/2017

## 2021-06-13 NOTE — PROGRESS NOTES
[facility] Daily Progress     Patient Name: Junior Hudson  Date: 9/20/2017  Visit #: 6/12  Referral Diagnosis: Spinal Stenosis, unsteady gait  Referring provider: Ras Murguia MD  Visit Diagnosis:     ICD-10-CM    1. Spinal stenosis, lumbar M48.06    2. Imbalance R26.89    3. Gait disturbance R26.9    4. Osteoporosis M81.0        Assessment:     Patient presents with gait disturbance, lumbar stenosis and history of falls due to bilateral leg weakness and imbalance. He is a fall risk scoring 20 seconds on his TUG test with 14 seconds or less being a decreased falls risk. Patient able to tolerate 25 minutes of continuous activity with SBA. Patient demonstrates decreased confidence in higher level activity and needs encouragement to challenge self. Recommend use of AD with all functional activity.      HEP/POC compliance is  good .  Patient demonstrates understanding/independence with home program.  Patient is benefitting from skilled physical therapy and is making steady progress toward functional goals.  Patient is appropriate to continue with skilled physical therapy intervention, as indicated by initial plan of care.  Patient is progressing appropriately regarding strength, mobility and symptom management.    Goal Status:  Pt. will be independent with home exercise program in : in other weeks;12 weeks  Pt. will be able to walk : 10 minutes;with less difficulty;for household mobility;for community mobility;for exercise/recreation;other minutes;in other weeks  Other Minutes:: 12-15 minutes  Other Weeks:: 8-10 weeks  Patient will ascend / descend: step;curb;with assistive device;with less difficulty;in 6 weeks  Patient will transfer: sit/stand;supine/sit;floor/stand;for in/out of bed;for in/out of chair;with less difficulty;in other weeks  other weeks: 8-10 weeks  No Data Recorded    Plan / Patient Education:     Continue with initial plan of care.  Progress with home program as tolerated:  "    Plan for next visit: functional mobility,sit to stands, yoga ball, balance, patient would like to be able to do the step at his daughters house, which he had a lot of difficulty with before. Practice floor to stand transfers    Subjective:     Pain Ratin No complaints    Functional limitations are described as occurring with:   performing routine daily activities  transitional movements sit to stand and sit to supine  walking for extended periods of time      Objective:   Transfers sit to stand independent, arms on chair, safely and without LOB upon initial stance  Patient ambulates with RW but was able to ambulate without, SBA for safety  Nu Step x 6 min, level 5, seat 13 arms 11-not today  Today  2' walk test 300' with 4WW RPE 2/10-as warm up  Standing ambulation 5 feet, HHA, not safe required min A to turn  Standing in parallel bars walking  4 feet on airex mat x 4 reps  Standing balance on airex mat with vetical and horizontal head turns  Seated toe taps, standing marching  Step ups marching and lateral one step with weight shifting, standing balance activities rhomberg position feet together, apart and     Review of HEP  Exercises:  Exercise #1: sit/ 30\" daily  Comment #1: goal 5+  Exercise #2: seated marching  Comment #2: 20 times 2x daily  Exercise #3: hip extension/abduction standing at counter adding \"teetor totter\" for balance  Comment #3: 10 times 2x daily  Exercise #4: standing rhomberg balance activities with hed trns  Comment #4: daily      TREATMENT MINUTES COMMENTS   Evaluation     Self-care/ Home management     Manual therapy     Neuromuscular Re-education 13 Rhomberg balance on airex mat surface   Therapeutic Activity     Therapeutic Exercises 15 See HEP   Gait training     Modality__________________                Total 28    Blank areas are intentional and mean the treatment did not include these items.       Lizzie Bell, PT  2017    "

## 2021-06-13 NOTE — PROGRESS NOTES
Chief Complaint   Patient presents with     Flu Vaccine     Flu consent and contraindication forms are given/ signed/ reviewed and sent to medical records to scan.     Julia Lancaster CMA [facility] 10/3/2017 12:27 PM

## 2021-06-13 NOTE — PROGRESS NOTES
[facility] Daily Progress     Patient Name: Junior Hudson  Date: 9/22/2017  Visit #: 7/12  Referral Diagnosis: Spinal Stenosis, unsteady gait  Referring provider: Ras Murguia MD  Visit Diagnosis:     ICD-10-CM    1. Spinal stenosis, lumbar M48.06    2. Imbalance R26.89    3. Gait disturbance R26.9    4. Osteoporosis M81.0        Assessment:     Patient presents with gait disturbance, lumbar stenosis and history of falls due to bilateral leg weakness and imbalance. He is a fall risk scoring 20 seconds on his TUG test with 14 seconds or less being a decreased falls risk. Patient able to tolerate 25 minutes of continuous activity with SBA. Patient demonstrates decreased confidence in higher level activity and needs encouragement to challenge self. Recommend use of AD with all functional activity.      HEP/POC compliance is  good .  Patient demonstrates understanding/independence with home program.  Patient is benefitting from skilled physical therapy and is making steady progress toward functional goals.  Patient is appropriate to continue with skilled physical therapy intervention, as indicated by initial plan of care.  Patient is progressing appropriately regarding strength, mobility and symptom management.    Goal Status:  Pt. will be independent with home exercise program in : in other weeks;12 weeks  Pt. will be able to walk : 10 minutes;with less difficulty;for household mobility;for community mobility;for exercise/recreation;other minutes;in other weeks  Other Minutes:: 12-15 minutes  Other Weeks:: 8-10 weeks  Patient will ascend / descend: step;curb;with assistive device;with less difficulty;in 6 weeks  Patient will transfer: sit/stand;supine/sit;floor/stand;for in/out of bed;for in/out of chair;with less difficulty;in other weeks  other weeks: 8-10 weeks  No Data Recorded    Plan / Patient Education:     Continue with initial plan of care.  Progress with home program as tolerated:  "    Plan for next visit: functional mobility,sit to stands, yoga ball, balance, patient would like to be able to do the step at his daughters house, which he had a lot of difficulty with before. Practice floor to stand transfers or mat transfers all fours, frequent falls    Subjective:     Pain Ratin No complaints    Functional limitations are described as occurring with:   performing routine daily activities -improving  transitional movements sit to stand and sit to supine-sit stand improved  walking for extended periods of time-improving  Has not had any falls      Objective:   Transfers sit to stand independent, arms on chair, safely and without LOB upon initial stance, transfers sit<>supine independent  Patient ambulates with RW but was able to ambulate without, SBA for safety  Today:  Nu Step x 8 min, level 5, seat 13 arms 11  Transfers sit<>supine independent  LTR use of abdominals, abdominal marches      Review of HEP  Exercises:  Exercise #1: sit/ 30\" daily  Comment #1: goal 5+  Exercise #2: LTR with abdominals  Comment #2: supine or seated abdominal marching  Exercise #3: hip extension/abduction standing at counter adding \"teetor totter\" for balance  Comment #3: 10 times 2x daily  Exercise #4: standing rhomberg balance activities with hed trns  Comment #4: daily  Exercise #5: bridge with transfer to 1/2 stance in S/L for weightbearing      TREATMENT MINUTES COMMENTS   Evaluation     Self-care/ Home management     Manual therapy     Neuromuscular Re-education     Therapeutic Activity     Therapeutic Exercises 27 See HEP   Gait training     Modality__________________                Total 27    Blank areas are intentional and mean the treatment did not include these items.       Lizzie Bell, PT  2017    "

## 2021-06-13 NOTE — PROGRESS NOTES
[facility] Follow Up Note    Junior Hudson   89 y.o. male    Date of Visit: 10/18/2017    Chief Complaint   Patient presents with     Follow-up     6 mo follow up, Rt knee pain     Subjective  Junior is here with the daughter and wife.  Still living at home.    Patient's 2 main issues are spinal stenosis and history of stroke.    He has had progressive decline with reduced mobility, increasing low back pain and leg weakness and has had multiple falls earlier this year, last was August 22.  No falls since then.  He did physical therapy with significant improvement in functionality and reduce pain.    He still taking naproxen twice a day on most days.  No GI upset with that.    MRI in August of this year did show stable moderate central canal spinal stenosis, but was not felt to be severe enough for surgical referral.  Patient did have significant improved functionality and ambulation with physical therapy, but that ended 2 weeks ago.  He has not been as active since the wife is noticed some worsening kyphotic gait and somewhat more shuffling gait.  Patient denies any worsening pain.    His right knee pain with previous DJD and meniscal tear is been acting up recently, is requesting a cortisone shot.    He does use his 4 wheeled walker at all times.  Her graph because of the frequent falls earlier this year Eliquis was discontinued in August.  He has had a past history of paroxysmal atrial fibrillation but no evidence of recurrence since ablation in 2011.  April 2017  14 day heart monitor without atrial fibrillation.  BRIAN previously showed his left atrial appendage was not amenable to a watchman.    He did not restart the aspirin yet.    No new neurologic events, no confusional spells or difficulty speaking.    Brain MRI August 2017 was 3 small subacute strokes and an old left basal ganglion lacunar.  Amyloid angiopathy noted.  Her graph carotid ultrasound October 2016 negative for  stenosis.    No chest pain or chest pressure or palpitations.    Blood pressures been well controlled without orthostasis on lisinopril 2.5 mg a day.    Still on pravastatin 20 mg a day.  Back in 2015 his LDL was 45 and HDL 68, not fasting today.    He has moderate to severe BPH with urinating every 1-2 hours at night.  On Proscar.  No dysuria or acute change.    Patient does have a mild essential tremor, worse under stress.  No rest tremor or parkinsonian tremor noted yet.    Chronic dysthymia has been good this fall, still on citalopram 20 mg.  No longer on Ambien but does use melatonin at night.  Sleep is adequate.    He just saw his ophthalmologist within the last month, I pressure stable on current drops.    Bowels are regular with stool softener, no blood in stool or diarrhea.  Hemoglobin was 13.4 in November of last year.    TSH was normal year ago.    Eating well and no swallowing difficulties or new cough.  PMHx:    Past Medical History:   Diagnosis Date     BPH (benign prostatic hyperplasia)     proscar     CAD (coronary artery disease)     CARMELLA mid PDA '08.  Neg stress echo/nl EF and wall motion.  mild MR '12     Coronary artery disease involving native coronary artery without angina pectoris     Cath 08 with severe stenosis of D2 off LAD and PDA off RCA.   Taxus stent of PDA and PTCA of RCA branch.  No intervention at D2.        Dysthymia     citalopram     Glaucoma     on drops     HTN (hypertension)     labile     Paroxysmal atrial fibrillation     none since ablation with pulm. v. isolation '11     TIA (transient ischemic attack)     Possible:  dysarthria 2/14 (carotid u/s neg for stenosis).  6/15 confusion/ left hand numb     PSHx:    Past Surgical History:   Procedure Laterality Date     CORONARY STENT PLACEMENT      9 years ago     failed watchman  11/17/2016     IN ABLATE HEART DYSRHYTHM FOCUS      Description: Catheter Ablation Atrial Fibrillation;  Recorded: 07/15/2014;  Comments: PVI Apr 2011  "(Cryo-PVI + Roof line + CTI line)     SC REMOVAL GALLBLADDER      Description: Cholecystectomy;  Recorded: 09/17/2008;  Comments: lap in '90     Immunizations:   Immunization History   Administered Date(s) Administered     Influenza high dose, seasonal 09/06/2016, 10/03/2017     Influenza, Seasonal, Inj PF 10/26/2010     Influenza, inj, historic 10/20/2008, 09/29/2009, 09/15/2011, 09/29/2012     Pneumo Conj 13-V (2010&after) 05/04/2015     Pneumo Polysac 23-V 05/04/1993, 08/15/2002     Td, historic 04/06/2000       ROS A comprehensive review of systems was performed and was otherwise negative    Medications, allergies, and problem list were reviewed and updated    Exam  /80  Pulse 67  Ht 5' 11\" (1.803 m)  Wt 183 lb (83 kg)  SpO2 98%  BMI 25.52 kg/m2  Alert and oriented ×3.  Good mood and affect.  Pupils and irises equal and reactive.  No dysarthria or facial droop.  Teeth in good condition and no pharyngitis.  No cervical or supraclavicular adenopathy.  Ears examined and minimal cerumen, not obstructed.  Tympanic membranes look normal.  No JVD.  No carotid bruits.  Lungs are clear to auscultation with good respiratory excursion.  Moderate kyphosis.  Lower back with loss of lordosis, but no bruising or tenderness to palpation.  Able to stand from a sitting position and ambulate to exam table.  No foot drop.  Not a shuffling gait.  No rest tremor.  Heart is regular with no murmur.  Abdomen is nontender and soft no hepatomegaly.  He does have trace to +1 lower extremity edema bilaterally.    He admits to sleeping in a chair recently.    Assessment/Plan  1. Essential hypertension  Well-controlled on lisinopril 2.5 mg a day.    2. Spinal stenosis of lumbar region, unspecified whether neurogenic claudication present  Moderately severe, improved with physical therapy.  Needs to be better about his regular back exercises and regular ambulation with walker.    If worsening back weakness or leg symptoms, plan " reevaluation for possible progression of her spinal stenosis.    Walk with all ambulation with his history of falls.    3. Hypercholesterolemia  Low-dose pravastatin 20 mg a day.  I am not planning a higher dose given his global muscle weakness and age.  But he does have a history of stroke.    4. History of stroke, no new event  Restart aspirin  - aspirin 81 MG EC tablet; Take 1 tablet (81 mg total) by mouth daily.; Refill: 0    No evidence of recurrent paroxysmal atrial fibrillation.  No longer on Eliquis because of fall risk/bleeding risk.    5. Medication monitoring encounter    - Comprehensive Metabolic Panel  - HM2(CBC w/o Differential)    6. Right knee pain, unspecified chronicity  Consistent with right knee DJD.  There is no heat or significant swelling today.  Good range of motion.  He may benefit from cortisone shot to improve mobility.  Uses walker with ambulation.    He is using naproxen twice a day, did warn him on risk both cardiac, stroke and kidney injury risk.  He feels benefit outweighs the risk at this time.  Denies GI side effects with it.  - Ambulatory referral to Orthopedic Surgery    He is already had his full flu shot.    Dysthymia, controlled on citalopram and melatonin at night.    Glaucoma, recently seen by eye doctor, continue drops.    Coronary artery disease with drug-eluting stent in 2008.  Negative stress test in April 2016 with ejection fraction 60%.  No chest pain or new heart symptoms.  Medical management with aspirin and pravastatin.    Moderately severe BPH on Proscar.  Call if worsening symptoms or acute UTI symptoms.    Return in about 6 months (around 4/18/2018) for Recheck.   Patient Instructions   Walk on a regular basis with walker.    Do your back exercises on a regular basis.    See our  to get an appointment at orthopedic clinic for possible cortisone shot of your knee.    Restart aspirin 81 mg a day.    Routine lab work today.    Routine six-month follow-up if  stable.    Ras Murguia MD  Total time with patient over 25 minutes and over 50% coord care.  Time all face to face.      Current Outpatient Prescriptions   Medication Sig Dispense Refill     calcium carbonate (OS-LEWIS) 600 mg (1,500 mg) tablet Take 600 mg by mouth 2 (two) times a day with meals.       cholecalciferol, vitamin D3, (VITAMIN D3) 1,000 unit capsule Take 1,000 Units by mouth daily.        citalopram (CELEXA) 20 MG tablet Take one (1) tablet by mouth daily 90 tablet 0     finasteride (PROSCAR) 5 mg tablet Take one (1) tablet daily 90 tablet 3     FOLIC ACID/MULTIVITS-MIN/LUT (CENTRUM SILVER ORAL) Take 1 tablet by mouth daily.       glucosamine-chondroitin 500-400 mg cap Take 3 capsules by mouth 2 (two) times a day.       latanoprost (XALATAN) 0.005 % ophthalmic solution Administer 1 drop to both eyes bedtime.        lisinopril (PRINIVIL,ZESTRIL) 2.5 MG tablet Take 1 tablet (2.5 mg total) by mouth daily. 90 tablet 0     melatonin 10 mg cap Take by mouth.       naproxen sodium (ALEVE) 220 MG tablet Take 440 mg by mouth 2 (two) times a day with meals.       nitroglycerin (NITROSTAT) 0.4 MG SL tablet Place 1 tablet under the tongue as needed for chest pain, if no relief, may repeat in 5 minutes up to a max of 3 doses, if still no relief c 25 tablet 4     omega-3 fatty acids-vitamin E 1,000 mg cap Take 2 capsules by mouth daily.       pravastatin (PRAVACHOL) 20 MG tablet Take one (1) tablet each night at bedtime 90 tablet 3     timolol maleate (TIMOPTIC) 0.5 % ophthalmic solution Administer 1 drop to both eyes 2 (two) times a day. daily       aspirin 81 MG EC tablet Take 1 tablet (81 mg total) by mouth daily.  0     No current facility-administered medications for this visit.      Allergies   Allergen Reactions     Sulfa (Sulfonamide Antibiotics) Rash     Social History   Substance Use Topics     Smoking status: Never Smoker     Smokeless tobacco: None     Alcohol use None

## 2021-06-13 NOTE — PROGRESS NOTES
[facility] Daily Progress     Patient Name: Junior Hudson  Date: 9/15/2017  Visit #: 5/12  Referral Diagnosis: Spinal Stenosis, unsteady gait  Referring provider: Ras Murguia MD  Visit Diagnosis:     ICD-10-CM    1. Spinal stenosis, lumbar M48.06    2. Imbalance R26.89    3. Gait disturbance R26.9    4. Osteoporosis M81.0        Assessment:     Patient presents with gait disturbance, lumbar stenosis and history of falls due to bilateral leg weakness and imbalance. He is a fall risk scoring 20 seconds on his TUG test with 14 seconds or less being a decreased falls risk. Patient able to tolerate 25 minutes of continuous activity with SBA. Patient demonstrates decreased confidence in higher level activity and needs encouragement to challenge self.        HEP/POC compliance is  good .  Patient demonstrates understanding/independence with home program.  Patient is benefitting from skilled physical therapy and is making steady progress toward functional goals.  Patient is appropriate to continue with skilled physical therapy intervention, as indicated by initial plan of care.  Patient is progressing appropriately regarding strength, mobility and symptom management.    Goal Status:  Pt. will be independent with home exercise program in : in other weeks;12 weeks  Pt. will be able to walk : 10 minutes;with less difficulty;for household mobility;for community mobility;for exercise/recreation;other minutes;in other weeks  Other Minutes:: 12-15 minutes  Other Weeks:: 8-10 weeks  Patient will ascend / descend: step;curb;with assistive device;with less difficulty;in 6 weeks  Patient will transfer: sit/stand;supine/sit;floor/stand;for in/out of bed;for in/out of chair;with less difficulty;in other weeks  other weeks: 8-10 weeks  No Data Recorded    Plan / Patient Education:     Continue with initial plan of care.  Progress with home program as tolerated:     Plan for next visit: functional mobility, curb and  "step training, sit to stands, yoga ball ex, - patient would like to be able to do the step at his daughters house, which he had a lot of difficulty with before.     Subjective:     Pain Ratin Only able to perform new exercises 1x a day, Going to reception this weekend and needs to negotiate stairs    Functional limitations are described as occurring with:   performing routine daily activities  transitional movements sit to stand and sit to supine  walking for extended periods of time      Objective:   Transfers sit to stand independent, arms on chair, safely and without LOB upon initial stance  Patient ambulates with RW but was able to ambulate without, SBA for safety  Nu Step x 6 min, level 5, seat 13 arms 11  Step ups marching and lateral one step with weight shifting, standing balance activities rhomberg position feet together, apart and staggered, with head turns  SB on riser for stability, core recruitment    Review of HEP  Exercises:  Exercise #1: sit/ 30\" daily  Comment #1: goal 5+  Exercise #2: seated marching  Comment #2: 20 times 2x daily  Exercise #3: hip extension/abduction standing at counter adding \"teetor totter\" for balance  Comment #3: 10 times 2x daily      TREATMENT MINUTES COMMENTS   Evaluation     Self-care/ Home management     Manual therapy     Neuromuscular Re-education 13 Rhomberg balance/SB on stable surface   Therapeutic Activity     Therapeutic Exercises 15 See HEP   Gait training  See above stairs   Modality__________________                Total 28    Blank areas are intentional and mean the treatment did not include these items.       Lizzie Bell, PT  9/15/2017    "

## 2021-06-14 NOTE — PROGRESS NOTES
[facility] Progress Note    Assessment:  1.  Coronary artery disease.  No symptoms to suggest angina.  He will continue to monitor for any specific symptoms.  He had a negative nuclear stress test in August 2016.    2.  Paroxysmal atrial fibrillation with prior ablation procedure.  Follows with Dr. Odonnell.  He does have a significant elevation in chads 2 vascular.  He made a conscious decision to come off Eliquis after a long discussion with his primary care physician notes of which I have reviewed.  Patient is aware of the potential risk of stroke.  He has returned to low-dose aspirin.  He was not able to have a successful watchman device placed.  I did forward a note to Dr. Odonnell as well.      3.  Hyperlipidemia.  On low-dose pravastatin.  His LDL cholesterol from 2015 was 45.  This is been stable with recent normal liver function tests.    4.  Hypertension blood pressures appear to be under good control.    Plan: As outlined above with follow-up in 6 months    1. Coronary artery disease involving native coronary artery of native heart without angina pectoris     2. Hypercholesterolemia     3. Paroxysmal atrial fibrillation           An After Visit Summary was printed and given to the patient.    Subjective:    Junior Hudson is a 89 y.o. male who returned for a planned  follow up visit.  He reports that from a cardiovascular standpoint he has been feeling well.  He reports an occasional fleeting chest discomfort that lasts only seconds in duration.  He has a history of coronary artery disease with remote coronary intervention.  This was in 2008.  The stenting was involving the mid posterior descending with a Taxus stent at that time and angioplasty of a high-grade posterior lateral right coronary artery lesion.  He has a history of paroxysmal atrial fibrillation.  He has undergone prior ablation for atrial fibrillation.  He has had no clinical or awareness of recurrence of atrial  fibrillation.  Secondary to his significantly elevated chads 2 vascular it was recommended that he remain on anticoagulation.  He was considered high risk because of increased risk of falls which is continued to be an issue.  Attempt was made to place a watchman device which was not successful in November 2016.  He subsequently has come off [facility] after discussing the issues in detail with his primary care physician.  He is clearly aware of the potential risks of stroke versus the potential risks of bleeding and he is back on a low-dose aspirin.    Review of Systems:   General: WNL  Eyes: WNL  Ears/Nose/Throat: WNL  Lungs: WNL  Heart: WNL  Stomach: WNL  Bladder: WNL  Muscle/Joints: WNL  Skin: WNL  Nervous System: WNL  Mental Health: WNL     Blood: WNL      Problem List:    Patient Active Problem List   Diagnosis     Hypercholesterolemia     Essential hypertension     Insomnia     Glaucoma     Cataract     Coronary artery disease involving native coronary artery without angina pectoris     Paroxysmal Atrial Fibrillation     Benign Prostatic Hypertrophy     Osteoporosis     Cerumen Impaction     Presbycusis     Cellulitis     Dysphagia     Hx-TIA (transient ischemic attack)     History of skin cancer     Medication monitoring encounter     Spinal stenosis, lumbar       Social History     Social History     Marital status:      Spouse name: N/A     Number of children: N/A     Years of education: N/A     Occupational History     Not on file.     Social History Main Topics     Smoking status: Never Smoker     Smokeless tobacco: Not on file     Alcohol use Not on file     Drug use: Not on file     Sexual activity: Not on file     Other Topics Concern     Not on file     Social History Narrative       No family history on file.    Current Outpatient Prescriptions   Medication Sig Dispense Refill     aspirin 81 MG EC tablet Take 1 tablet (81 mg total) by mouth daily.  0     calcium carbonate (OS-LWEIS) 600 mg (1,500  "mg) tablet Take 600 mg by mouth 2 (two) times a day with meals.       cholecalciferol, vitamin D3, (VITAMIN D3) 1,000 unit capsule Take 1,000 Units by mouth daily.        citalopram (CELEXA) 20 MG tablet Take one (1) tablet by mouth daily 90 tablet 0     finasteride (PROSCAR) 5 mg tablet Take one (1) tablet daily 90 tablet 3     FOLIC ACID/MULTIVITS-MIN/LUT (CENTRUM SILVER ORAL) Take 1 tablet by mouth daily.       glucosamine-chondroitin 500-400 mg cap Take 3 capsules by mouth 2 (two) times a day.       latanoprost (XALATAN) 0.005 % ophthalmic solution Administer 1 drop to both eyes bedtime.        lisinopril (PRINIVIL,ZESTRIL) 2.5 MG tablet Take 1 tablet (2.5 mg total) by mouth daily. 90 tablet 0     lisinopril (PRINIVIL,ZESTRIL) 2.5 MG tablet Take 1 tablet (2.5 mg total) by mouth daily. 90 tablet 2     melatonin 10 mg cap Take by mouth.       naproxen sodium (ALEVE) 220 MG tablet Take 440 mg by mouth 2 (two) times a day with meals.       nitroglycerin (NITROSTAT) 0.4 MG SL tablet Place 1 tablet under the tongue as needed for chest pain, if no relief, may repeat in 5 minutes up to a max of 3 doses, if still no relief c 25 tablet 4     omega-3 fatty acids-vitamin E 1,000 mg cap Take 2 capsules by mouth daily.       pravastatin (PRAVACHOL) 20 MG tablet Take one (1) tablet each night at bedtime 90 tablet 3     timolol maleate (TIMOPTIC) 0.5 % ophthalmic solution Administer 1 drop to both eyes 2 (two) times a day. daily       No current facility-administered medications for this visit.        Objective:     /74 (Patient Site: Right Arm, Patient Position: Sitting, Cuff Size: Adult Regular)  Pulse 60  Resp 18  Ht 5' 11\" (1.803 m)  Wt 180 lb (81.6 kg)  BMI 25.1 kg/m2  180 lb (81.6 kg)       Physical Exam: Examined in the chair    GENERAL APPEARANCE: alert, no apparent distress  HEENT: no scleral icterus or xanthelasma  NECK: jugular venous pressure within normal limits.  CHEST: symmetric, the lungs are clear to " "auscultation  CARDIOVASCULAR: regular rhythm with soft systolic murmur; no carotid bruits  Abdomen: No Organomegaly, masses, bruits, or tenderness. Bowels sounds are present      EXTREMITIES: no cyanosis, clubbing, mild ankle edema    Cardiac Testing:  CONCLUSION:   1. Lexiscan stress nuclear study is negative for inducible myocardial   ischemia or infarction.      COMMENTS:   Comparison with the images of the exam of April 7, 2011 demonstrates resolution of the minimal inferior ischemia seen on prior study.        Francine Nunez MD  8/25/2016 2:03 PM       Summary   1. Normal left ventricular size and systolic performance. The ejection   fraction is estimated to be 60%.   2. There is trace aortic insufficiency.   3. The left atrium is mildly enlarged.   4. There is no thrombus detected within the left atrium/left atrial   appendage (AUSTIN).   5. The left atrial appendage is relatively small and shallow. See attached   images (Note: to view in EPIC, one must open the attached PDF report by   clicking on \"Results\" > \"Scan on ....\" under the signature line of the   Epic generated report text below).   6. Echo contrast examination was performed using agitated NS as contrast   agent. The right heart opacity was adequate and the left heart was   adequately visualized. There was no evidence of right to left shunt during   spontaneous respiration or following release of Valsalva.    CONCLUSION:  Normal patient activated ECG monitor.  No symptomatic episodes  reported.  Baseline transmission showed normal sinus rhythm with first-degree AV  block.        JENIFFER MATT 04/19/2017 13:51:12  T 04/19/2017 14:55:32  R 04/19/2017 14:55:32  [id number]    Lab Results:    Lab Results   Component Value Date     10/18/2017    K 4.6 10/18/2017     10/18/2017    CO2 29 10/18/2017    BUN 20 10/18/2017    CREATININE 1.09 10/18/2017    CALCIUM 9.1 10/18/2017     Lab Results   Component Value Date    CHOL 122 11/04/2015 "    TRIG 47 11/04/2015    HDL 68 11/04/2015     BNP (pg/mL)   Date Value   02/10/2012 45     Creatinine (mg/dL)   Date Value   10/18/2017 1.09   04/17/2017 1.05   11/15/2016 0.97   10/24/2016 1.02     LDL Calculated (mg/dL)   Date Value   11/04/2015 45   11/12/2014 50   11/13/2013 57.2     Lab Results   Component Value Date    WBC 3.8 (L) 10/18/2017    WBC 3.9 (L) 11/12/2014    HGB 12.7 (L) 10/18/2017    HCT 38.0 (L) 10/18/2017    MCV 96 10/18/2017     10/18/2017               This note has been dictated using voice recognition software. Any grammatical or context distortions are unintentional and inherent to the software.      Rodney Spear M.D., F.A.C.C.  [facility]  [phone]

## 2021-06-16 PROBLEM — Z51.5 COMFORT MEASURES ONLY STATUS: Status: ACTIVE | Noted: 2018-01-01

## 2021-06-16 PROBLEM — Z51.5 PALLIATIVE CARE ENCOUNTER: Status: ACTIVE | Noted: 2018-01-01

## 2021-06-16 PROBLEM — R45.1 AGITATION: Status: ACTIVE | Noted: 2018-01-01

## 2021-06-16 PROBLEM — I63.9 CVA (CEREBRAL VASCULAR ACCIDENT) (H): Status: ACTIVE | Noted: 2018-01-01

## 2021-06-16 PROBLEM — R53.1 GENERALIZED WEAKNESS: Status: ACTIVE | Noted: 2018-01-01

## 2021-06-17 NOTE — PROGRESS NOTES
[facility] Follow Up Note    Junior Hudson   89 y.o. male    Date of Visit: 4/18/2018    Chief Complaint   Patient presents with     Follow-up     6 mo follow up, Rt knee pain had injection but it didnt help.      Subjective  Junior is here for routine follow-up of his hypertension and past history of coronary artery disease and paroxysmal atrial fibrillation.    Patient has had progressive lower extremity weakness.  History of spinal stenosis.  August 2017 MRI showed stable moderate canal stenosis.    Over the winter he has had bilateral leg weakness.  He has been ambulating regularly, goes to the bathroom about every hour with his severe BPH.  That stable.  He has been walking the hallways at [facility] with walker.  He will walk [facility] down the [place] with his legs getting significantly weak.  Is not always able to sit on his walker and sometimes slumped to the floor, needs help to get back up.  He has not had any injuries with falls.    He has chronic knee osteoarthritis especially the right knee.  He was just seen 2 weeks ago with cortisone shot which is temporary benefit.  A cortisone shot last fall as well.    He has been taking naproxen twice a day.  No epigastric pain or GI bleeding.    He is no longer on Eliquis that was discontinued last August, with history of paroxysmal atrial fibrillation but no evidence of recurrence since ablation in 2011.  He had a 14 day Holter monitor last year was negative.  Previous BRIAN showed that his left atrial appendage was not amenable to the watchman.  He is on low-dose aspirin 81 mg.    Coronary artery disease with a drug-eluting stent back in 2008 the [facility].  No problems since.  Negative stress test in 2016.  No chest pain or palpitations.  No increasing shortness of breath.    His lower extremity edema is stable at trace to +1.    Hypertension is controlled with lisinopril 2.5 mg a day.    He still on low-dose pravastatin.    He has  had evidence of small strokes.  August 2017 he has 3 small subacute CVAs and an old left basal ganglia lacunar.  Also some amyloid angiopathy noted.    No new cough or fever.    Chronic dysthymia, still on citalopram 20 mg a day and using melatonin at night.  No longer on Ambien.  He is looking forward to a men's group that he goes to 2 times a month, does social events at the [facility].  He denies hopelessness.    He has glaucoma, compliant with drops and has a six-month follow-up appointment soon.    His bowels are regular no abdominal pain.    He is a new growth on his right forearm, but does not wish to see the dermatologist at this time.    Non-smoker and no significant alcohol.    PMHx:    Past Medical History:   Diagnosis Date     BPH (benign prostatic hyperplasia)     proscar     CAD (coronary artery disease)     CARMELLA mid PDA '08.  Neg stress echo/nl EF and wall motion.  mild MR '12     Coronary artery disease involving native coronary artery without angina pectoris     Cath 08 with severe stenosis of D2 off LAD and PDA off RCA.   Taxus stent of PDA and PTCA of RCA branch.  No intervention at D2.        Dysthymia     citalopram     Glaucoma     on drops     HTN (hypertension)     labile     Paroxysmal atrial fibrillation     none since ablation with pulm. v. isolation '11     TIA (transient ischemic attack)     Possible:  dysarthria 2/14 (carotid u/s neg for stenosis).  6/15 confusion/ left hand numb     PSHx:    Past Surgical History:   Procedure Laterality Date     CORONARY STENT PLACEMENT      9 years ago     failed watchman  11/17/2016     VA ABLATE HEART DYSRHYTHM FOCUS      Description: Catheter Ablation Atrial Fibrillation;  Recorded: 07/15/2014;  Comments: PVI Apr 2011 (Cryo-PVI + Roof line + CTI line)     VA REMOVAL GALLBLADDER      Description: Cholecystectomy;  Recorded: 09/17/2008;  Comments: lap in '90     Immunizations:   Immunization History   Administered Date(s) Administered      "Influenza high dose, seasonal 09/06/2016, 10/03/2017     Influenza, Seasonal, Inj PF IIV3 10/26/2010     Influenza, inj, historic,unspecified 10/20/2008, 09/29/2009, 09/15/2011, 09/29/2012     Pneumo Conj 13-V (2010&after) 05/04/2015     Pneumo Polysac 23-V 05/04/1993, 08/15/2002     Td,adult,historic,unspecified 04/06/2000       ROS A comprehensive review of systems was performed and was otherwise negative    Medications, allergies, and problem list were reviewed and updated    Exam  /88  Pulse 66  Ht 5' 11\" (1.803 m)  SpO2 98%  Half a centimeter sclerotic nodule, well differentiated on right forearm.  Lungs are clear with good respiratory excursion.  Heart is regular with no murmur.  Abdomen is nontender and soft.  Trace to +1 lower extremity edema bilaterally and no calf tenderness.  Alert and oriented, speech within normal limits.  Moving all 4 extremities.  No foot drop, able to tap feet well.    Difficulty standing up from the sitting position, using his upper extremities significantly, feels unsteady standing and was unwilling to walk further because of unsteadiness and weakness of the legs.  Some moderate disuse atrophy of the legs bilaterally.  No inflammation or swelling of the knees.    Assessment/Plan  1. Spinal stenosis of lumbar region, unspecified whether neurogenic claudication present    Significant progression requiring further evaluation and referral.  Significant progression of his lower extremity weakness and reduced mobility.    Highly concerning for advancing spinal stenosis.  He is using the wheelchair much more often, having falls despite use of walker.    He will continue to try to maintain mobility at the independent living.  He is put on a deposit for assisted living.    He would be willing to consider spinal surgery, if indicated.    Repeat MRI of spine and refer to neurosurgery.    Pain is not a current issue, except for the knee arthritis.  Using naproxen.  - MR Lumbar Spine " With Without Contrast; Future  - Ambulatory referral to Neurosurgery    I put in a referral for physical therapy.    2. Essential hypertension  Controlled.  Mildly high diastolic.  Follow-up next month.  Continue low-dose lisinopril.    3. Coronary artery disease involving native coronary artery of native heart without angina pectoris  Asymptomatic.  Full code.    Low-dose aspirin, low-dose Pravachol.  Excellent cholesterol levels years ago, repeating labs today.  No plan for increasing Pravachol further given his muscle weakness and age.    History of strokes, suggestive associated with microvascular disease.  No evidence of new event.    4. Paroxysmal atrial fibrillation  No symptoms of recurrence.  No longer on anticoagulation because of falls.  Low-dose aspirin.    5. Hypercholesterolemia  Pravastatin as above  - Lipid Cascade    6. Medication monitoring encounter    - Comprehensive Metabolic Panel  - HM2(CBC w/o Differential)    Sclerotic lesion on right forearm, he does not wish to see dermatology this time.  I did tell patient it may be a skin cancer.  Weight and referral to dermatology until spinal stenosis evaluation proceeds.    Moderate to severe BPH, stable on Proscar.    Glaucoma, eczema followed by ophthalmology soon.  Continue drops.    Chronic dysthymia, stable on citalopram.  Melatonin at night.    DJD of right knee, not much benefit from a cortisone shot.  Continue mobility and use of walker.  Naproxen twice daily, checking kidney labs at this time.    Return in about 3 weeks (around 5/9/2018).   Patient Instructions   Routine lab work today.    No medication changes.    See  to set up MRI of the spine and get referral to neurosurgery, because of the spinal stenosis and increasing leg weakness.    Increase the frequency of regular walking with your walker, in order to maintain your mobility as much as possible.    Follow-up with me in approximately 3 weeks    Ras Murguia MD      Current  Outpatient Prescriptions   Medication Sig Dispense Refill     aspirin 81 MG EC tablet Take 1 tablet (81 mg total) by mouth daily.  0     calcium carbonate (OS-LEWIS) 600 mg (1,500 mg) tablet Take 600 mg by mouth 2 (two) times a day with meals.       cholecalciferol, vitamin D3, (VITAMIN D3) 1,000 unit capsule Take 1,000 Units by mouth daily.        citalopram (CELEXA) 20 MG tablet Take one (1) tablet by mouth daily 90 tablet 1     finasteride (PROSCAR) 5 mg tablet Take one (1) tablet daily 90 tablet 3     FOLIC ACID/MULTIVITS-MIN/LUT (CENTRUM SILVER ORAL) Take 1 tablet by mouth daily.       glucosamine-chondroitin 500-400 mg cap Take 3 capsules by mouth 2 (two) times a day.       latanoprost (XALATAN) 0.005 % ophthalmic solution Administer 1 drop to both eyes bedtime.        lisinopril (PRINIVIL,ZESTRIL) 2.5 MG tablet Take 1 tablet (2.5 mg total) by mouth daily. 90 tablet 2     melatonin 10 mg cap Take by mouth.       naproxen sodium (ALEVE) 220 MG tablet Take 440 mg by mouth 2 (two) times a day with meals.       nitroglycerin (NITROSTAT) 0.4 MG SL tablet Place 1 tablet under the tongue as needed for chest pain, if no relief, may repeat in 5 minutes up to a max of 3 doses, if still no relief c 25 tablet 4     omega-3 fatty acids-vitamin E 1,000 mg cap Take 2 capsules by mouth daily.       pravastatin (PRAVACHOL) 20 MG tablet Take one (1) tablet each night at bedtime 90 tablet 3     timolol maleate (TIMOPTIC) 0.5 % ophthalmic solution Administer 1 drop to both eyes 2 (two) times a day. daily       triamcinolone (KENALOG) 0.1 % ointment 1 Application twice a day as needed topically Avoid face, neck, groinand body folds 30 g 0     No current facility-administered medications for this visit.      Allergies   Allergen Reactions     Sulfa (Sulfonamide Antibiotics) Rash     Social History   Substance Use Topics     Smoking status: Never Smoker     Smokeless tobacco: Never Used     Alcohol use None